# Patient Record
Sex: FEMALE | Race: WHITE | NOT HISPANIC OR LATINO
[De-identification: names, ages, dates, MRNs, and addresses within clinical notes are randomized per-mention and may not be internally consistent; named-entity substitution may affect disease eponyms.]

---

## 2017-02-13 ENCOUNTER — OTHER (OUTPATIENT)
Age: 72
End: 2017-02-13

## 2018-04-12 ENCOUNTER — NON-APPOINTMENT (OUTPATIENT)
Age: 73
End: 2018-04-12

## 2018-04-12 ENCOUNTER — APPOINTMENT (OUTPATIENT)
Dept: CARDIOLOGY | Facility: CLINIC | Age: 73
End: 2018-04-12
Payer: MEDICARE

## 2018-04-12 VITALS
SYSTOLIC BLOOD PRESSURE: 125 MMHG | BODY MASS INDEX: 24.27 KG/M2 | HEIGHT: 63 IN | DIASTOLIC BLOOD PRESSURE: 69 MMHG | OXYGEN SATURATION: 96 % | WEIGHT: 137 LBS | HEART RATE: 50 BPM

## 2018-04-12 DIAGNOSIS — R60.9 EDEMA, UNSPECIFIED: ICD-10-CM

## 2018-04-12 PROCEDURE — 99214 OFFICE O/P EST MOD 30 MIN: CPT

## 2018-04-12 PROCEDURE — 93000 ELECTROCARDIOGRAM COMPLETE: CPT

## 2018-04-13 ENCOUNTER — APPOINTMENT (OUTPATIENT)
Dept: CARDIOLOGY | Facility: CLINIC | Age: 73
End: 2018-04-13
Payer: MEDICARE

## 2018-04-13 PROCEDURE — 93306 TTE W/DOPPLER COMPLETE: CPT

## 2018-04-18 ENCOUNTER — APPOINTMENT (OUTPATIENT)
Dept: CARDIOLOGY | Facility: CLINIC | Age: 73
End: 2018-04-18
Payer: MEDICARE

## 2018-04-18 PROCEDURE — 93015 CV STRESS TEST SUPVJ I&R: CPT

## 2019-01-11 ENCOUNTER — NEW REFERRAL (OUTPATIENT)
Dept: URBAN - METROPOLITAN AREA CLINIC 33 | Facility: CLINIC | Age: 74
End: 2019-01-11

## 2019-01-11 VITALS
HEIGHT: 63 IN | BODY MASS INDEX: 24.45 KG/M2 | SYSTOLIC BLOOD PRESSURE: 158 MMHG | WEIGHT: 138 LBS | DIASTOLIC BLOOD PRESSURE: 79 MMHG | HEART RATE: 52 BPM

## 2019-01-11 DIAGNOSIS — H02.836: ICD-10-CM

## 2019-01-11 DIAGNOSIS — H43.392: ICD-10-CM

## 2019-01-11 DIAGNOSIS — H25.13: ICD-10-CM

## 2019-01-11 DIAGNOSIS — H35.3112: ICD-10-CM

## 2019-01-11 DIAGNOSIS — H02.833: ICD-10-CM

## 2019-01-11 DIAGNOSIS — H35.3221: ICD-10-CM

## 2019-01-11 DIAGNOSIS — H04.123: ICD-10-CM

## 2019-01-11 PROCEDURE — 92134 CPTRZ OPH DX IMG PST SGM RTA: CPT

## 2019-01-11 PROCEDURE — 92235 FLUORESCEIN ANGRPH MLTIFRAME: CPT

## 2019-01-11 PROCEDURE — 92250 FUNDUS PHOTOGRAPHY W/I&R: CPT

## 2019-01-11 PROCEDURE — 67028 INJECTION EYE DRUG: CPT

## 2019-01-11 PROCEDURE — 99204 OFFICE O/P NEW MOD 45 MIN: CPT

## 2019-01-11 ASSESSMENT — TONOMETRY
OS_IOP_MMHG: 14
OD_IOP_MMHG: 13

## 2019-01-11 ASSESSMENT — VISUAL ACUITY
OS_CC: 20/25
OD_CC: 20/30-1

## 2019-02-15 ENCOUNTER — CLINICAL PROCEDURE AND DIAGNOSTIC TESTING ONLY (OUTPATIENT)
Dept: URBAN - METROPOLITAN AREA CLINIC 33 | Facility: CLINIC | Age: 74
End: 2019-02-15

## 2019-02-15 DIAGNOSIS — H35.3221: ICD-10-CM

## 2019-02-15 DIAGNOSIS — H35.3112: ICD-10-CM

## 2019-02-15 PROCEDURE — 67028 INJECTION EYE DRUG: CPT

## 2019-02-15 PROCEDURE — 92134 CPTRZ OPH DX IMG PST SGM RTA: CPT

## 2019-02-15 ASSESSMENT — VISUAL ACUITY
OS_PH: 20/30+1
OS_CC: 20/50+1

## 2019-02-15 ASSESSMENT — TONOMETRY: OS_IOP_MMHG: 10

## 2019-03-22 ENCOUNTER — CLINICAL PROCEDURE AND DIAGNOSTIC TESTING ONLY (OUTPATIENT)
Dept: URBAN - METROPOLITAN AREA CLINIC 33 | Facility: CLINIC | Age: 74
End: 2019-03-22

## 2019-03-22 DIAGNOSIS — H35.3221: ICD-10-CM

## 2019-03-22 DIAGNOSIS — H35.3112: ICD-10-CM

## 2019-03-22 PROCEDURE — 67028 INJECTION EYE DRUG: CPT

## 2019-03-22 PROCEDURE — 92134 CPTRZ OPH DX IMG PST SGM RTA: CPT

## 2019-03-22 ASSESSMENT — TONOMETRY: OS_IOP_MMHG: 12

## 2019-03-22 ASSESSMENT — VISUAL ACUITY: OS_CC: 20/40-2

## 2019-05-20 ENCOUNTER — APPOINTMENT (OUTPATIENT)
Dept: CARDIOLOGY | Facility: CLINIC | Age: 74
End: 2019-05-20
Payer: MEDICARE

## 2019-05-20 ENCOUNTER — NON-APPOINTMENT (OUTPATIENT)
Age: 74
End: 2019-05-20

## 2019-05-20 VITALS
BODY MASS INDEX: 24.63 KG/M2 | HEART RATE: 58 BPM | WEIGHT: 139 LBS | DIASTOLIC BLOOD PRESSURE: 72 MMHG | HEIGHT: 63 IN | SYSTOLIC BLOOD PRESSURE: 116 MMHG | OXYGEN SATURATION: 97 %

## 2019-05-20 PROCEDURE — 93000 ELECTROCARDIOGRAM COMPLETE: CPT

## 2019-05-20 PROCEDURE — 99214 OFFICE O/P EST MOD 30 MIN: CPT

## 2020-01-17 ENCOUNTER — FOLLOW UP AND POST INJECTION EVALUATION (OUTPATIENT)
Dept: URBAN - METROPOLITAN AREA CLINIC 33 | Facility: CLINIC | Age: 75
End: 2020-01-17

## 2020-01-17 VITALS — HEIGHT: 63 IN | WEIGHT: 138 LBS | BODY MASS INDEX: 24.45 KG/M2

## 2020-01-17 DIAGNOSIS — H02.836: ICD-10-CM

## 2020-01-17 DIAGNOSIS — H43.392: ICD-10-CM

## 2020-01-17 DIAGNOSIS — H35.3112: ICD-10-CM

## 2020-01-17 DIAGNOSIS — H02.833: ICD-10-CM

## 2020-01-17 DIAGNOSIS — H04.123: ICD-10-CM

## 2020-01-17 DIAGNOSIS — H25.13: ICD-10-CM

## 2020-01-17 DIAGNOSIS — H35.3221: ICD-10-CM

## 2020-01-17 PROCEDURE — 92014 COMPRE OPH EXAM EST PT 1/>: CPT

## 2020-01-17 PROCEDURE — 67028 INJECTION EYE DRUG: CPT

## 2020-01-17 PROCEDURE — 92134 CPTRZ OPH DX IMG PST SGM RTA: CPT

## 2020-01-17 PROCEDURE — 92250 FUNDUS PHOTOGRAPHY W/I&R: CPT

## 2020-01-17 ASSESSMENT — VISUAL ACUITY
OD_CC: 20/40+2
OS_CC: 20/30-2

## 2020-01-17 ASSESSMENT — TONOMETRY
OS_IOP_MMHG: 13
OD_IOP_MMHG: 12

## 2020-02-17 ENCOUNTER — FOLLOW UP AND POST INJECTION EVALUATION (OUTPATIENT)
Dept: URBAN - METROPOLITAN AREA CLINIC 33 | Facility: CLINIC | Age: 75
End: 2020-02-17

## 2020-02-17 DIAGNOSIS — H35.3221: ICD-10-CM

## 2020-02-17 DIAGNOSIS — H43.392: ICD-10-CM

## 2020-02-17 DIAGNOSIS — H35.3112: ICD-10-CM

## 2020-02-17 PROCEDURE — 92134 CPTRZ OPH DX IMG PST SGM RTA: CPT

## 2020-02-17 PROCEDURE — 92012 INTRM OPH EXAM EST PATIENT: CPT

## 2020-02-17 PROCEDURE — 67028 INJECTION EYE DRUG: CPT

## 2020-02-17 PROCEDURE — 92250 FUNDUS PHOTOGRAPHY W/I&R: CPT

## 2020-02-17 ASSESSMENT — VISUAL ACUITY
OD_CC: 20/40-1
OS_CC: 20/30-2

## 2020-02-17 ASSESSMENT — TONOMETRY
OS_IOP_MMHG: 08
OD_IOP_MMHG: 08

## 2021-03-08 NOTE — PROCEDURE NOTE: CLINICAL
PROCEDURE NOTE: Avastin () OD. Diagnosis: Neovascular AMD with Active CNV. Anesthesia: Lidocaine 4%. Prep: Betadine Drops. Prior to injection, risks/benefits/alternatives discussed including infection, loss of vision, hemorrhage, cataract, glaucoma, retinal tears or detachment. The off-label status of Intravitreal Avastin also was reviewed. The patient wished to proceed with treatment. Topical anesthesia was induced with Alcaine. Additional anesthesia was achieved using drop(s) or injection checked above. a drop of Povidone-iodine 5% ophthalmic solution was instilled over the injection site and in the inferior fornix. Betadine prep was performed. Using the syringe provided, Avastin 1.25 mg in 0.05 cc was injected into the vitreous cavity. The needle was passed 3.0 mm posterior to the limbus in pseudophakic patients, and 3.5 mm posterior to the lumbus in phakic patients. The remainder of the Avastin in the single-use vial was then discarded in a medical waste disposal container. Unused medication was discarded. Patient tolerated procedure well. There were no complications. Injection time: 12:26PM. Post-op instructions given. Expiration Date: 05-. The patient was instructed to return for re-evaluation in approximately 4-12 weeks depending on his/her condition and was told to call immediately if vision decreases and/or if his/her eye becomes red, painful, and/or light sensitive. The patient was instructed to go to the emergency room or call 911 if unable to reach the doctor within an hour or two of trying or calling. The patient was instructed to use Artificial Tears q.i.d. p.r.n for comfort. Sheng Nazario

## 2021-04-13 NOTE — PATIENT DISCUSSION
Reviewed OPTIONS including AVASTIN/EYLEA/LUCENTIS/BEOVU and patient wants to proceed with AVASTIN treatment AND REPEAT EVERY 29 DAYS X 2 DOI.

## 2021-04-13 NOTE — PROCEDURE NOTE: CLINICAL
PROCEDURE NOTE: Avastin () OD. Diagnosis: Neovascular AMD with Active CNV. Anesthesia: Lidocaine 4%. Prep: Betadine Drops. Prior to injection, risks/benefits/alternatives discussed including infection, loss of vision, hemorrhage, cataract, glaucoma, retinal tears or detachment. The off-label status of Intravitreal Avastin also was reviewed. The patient wished to proceed with treatment. Topical anesthesia was induced with Alcaine. Additional anesthesia was achieved using drop(s) or injection checked above. a drop of Povidone-iodine 5% ophthalmic solution was instilled over the injection site and in the inferior fornix. Betadine prep was performed. Using the syringe provided, Avastin 1.25 mg in 0.05 cc was injected into the vitreous cavity. The needle was passed 3.0 mm posterior to the limbus in pseudophakic patients, and 3.5 mm posterior to the lumbus in phakic patients. The remainder of the Avastin in the single-use vial was then discarded in a medical waste disposal container. Unused medication was discarded. Patient tolerated procedure well. There were no complications. Injection time: 4:30PM. Post-op instructions given. Expiration Date: 6/22/2021. The patient was instructed to return for re-evaluation in approximately 4-12 weeks depending on his/her condition and was told to call immediately if vision decreases and/or if his/her eye becomes red, painful, and/or light sensitive. The patient was instructed to go to the emergency room or call 911 if unable to reach the doctor within an hour or two of trying or calling. The patient was instructed to use Artificial Tears q.i.d. p.r.n for comfort. Yvonne Rodriguez

## 2021-05-12 NOTE — PATIENT DISCUSSION
5 12 21 AVASTIN TODAY - MILD SRF DESPITE A X 2 - NO FURTHER IMPROVEMENT - REVIEWED WITH PT WHO WOULD LIKE TO CHANGE TO EYLEA IF INSURANCE WILL APPROVE FOR HER NEXT VISIT.

## 2021-05-12 NOTE — PROCEDURE NOTE: CLINICAL
PROCEDURE NOTE: Avastin () OD. Diagnosis: Neovascular AMD with Active CNV. Anesthesia: Lidocaine 4%. Prep: Betadine Drops. Prior to injection, risks/benefits/alternatives discussed including infection, loss of vision, hemorrhage, cataract, glaucoma, retinal tears or detachment. The off-label status of Intravitreal Avastin also was reviewed. The patient wished to proceed with treatment. Topical anesthesia was induced with Alcaine. Additional anesthesia was achieved using drop(s) or injection checked above. a drop of Povidone-iodine 5% ophthalmic solution was instilled over the injection site and in the inferior fornix. Betadine prep was performed. Using the syringe provided, Avastin 1.25 mg in 0.05 cc was injected into the vitreous cavity. The needle was passed 3.0 mm posterior to the limbus in pseudophakic patients, and 3.5 mm posterior to the lumbus in phakic patients. The remainder of the Avastin in the single-use vial was then discarded in a medical waste disposal container. Unused medication was discarded. Patient tolerated procedure well. There were no complications. Injection time: 900AM. Post-op instructions given. Expiration Date: 6/24/2021. The patient was instructed to return for re-evaluation in approximately 4-12 weeks depending on his/her condition and was told to call immediately if vision decreases and/or if his/her eye becomes red, painful, and/or light sensitive. The patient was instructed to go to the emergency room or call 911 if unable to reach the doctor within an hour or two of trying or calling. The patient was instructed to use Artificial Tears q.i.d. p.r.n for comfort. Latricia Castellano

## 2021-06-10 NOTE — PROCEDURE NOTE: CLINICAL
PROCEDURE NOTE: Avastin () OD. Diagnosis: Neovascular AMD with Active CNV. Anesthesia: Lidocaine 4%. Prep: Betadine Drops. Prior to injection, risks/benefits/alternatives discussed including infection, loss of vision, hemorrhage, cataract, glaucoma, retinal tears or detachment. The off-label status of Intravitreal Avastin also was reviewed. The patient wished to proceed with treatment. Topical anesthesia was induced with Alcaine. Additional anesthesia was achieved using drop(s) or injection checked above. a drop of Povidone-iodine 5% ophthalmic solution was instilled over the injection site and in the inferior fornix. Betadine prep was performed. Using the syringe provided, Avastin 1.25 mg in 0.05 cc was injected into the vitreous cavity. The needle was passed 3.0 mm posterior to the limbus in pseudophakic patients, and 3.5 mm posterior to the lumbus in phakic patients. The remainder of the Avastin in the single-use vial was then discarded in a medical waste disposal container. Unused medication was discarded. Patient tolerated procedure well. There were no complications. Injection time: 4:00 PM. Post-op instructions given. Expiration Date: 8/2/2021. The patient was instructed to return for re-evaluation in approximately 4-12 weeks depending on his/her condition and was told to call immediately if vision decreases and/or if his/her eye becomes red, painful, and/or light sensitive. The patient was instructed to go to the emergency room or call 911 if unable to reach the doctor within an hour or two of trying or calling. The patient was instructed to use Artificial Tears q.i.d. p.r.n for comfort. Samantha Uribe

## 2021-07-12 NOTE — PROCEDURE NOTE: CLINICAL
PROCEDURE NOTE: Eylea PFS OD. Diagnosis: Neovascular AMD with Active CNV. Anesthesia: Topical. Prep: Betadine Drops. Prior to injection, risks/benefits/alternatives discussed including but not limited to infection, loss of vision or eye, hemorrhage, cataract, glaucoma, retinal tears or detachment. The patient wished to proceed with treatment. Betadine prep was performed. Topical anesthesia was induced with Alcaine. Additional anesthesia was achieved using drop(s) or injection checked above. A drop of Povidone-iodine 5% ophthalmic solution was instilled over the injection site and in the inferior fornix. A single use prefilled syringe of intravitreal Eylea 2mg/0.05ml was used and excess was disposed of as waste. The needle was passed 3.0 mm posterior to the limbus in pseudophakic patients, and 3.5 mm posterior to the limbus in phakic patients. Injection time: 10:58 AM.  Patient tolerated procedure well. There were no complications. The eye was irrigated with sterile irrigating solution. Post procedure instructions given. The patient was instructed to use Artificial Tears q.i.d. p.r.n for comfort. The patient was instructed to return for re-evaluation in approximately 4-12 weeks depending on his/her condition and was told to call immediately if vision decreases and/or if his/her eye becomes red, painful, and/or light sensitive. The patient was instructed to go to the emergency room or call 911 if unable to reach the doctor within an hour or two of trying or calling. Donnie Morrow

## 2021-07-12 NOTE — PATIENT DISCUSSION
7 12 21 MILD SRF 29 DAYS - IMPROVING BUT HAS NOT RESOLVED - TO TRY SWITCHING TO EYLEA TO SEE IF IT WORKS BETTER AND RE-EVAL WITH CHANDNI TRAMMELL IN 34 DAYS AT [facility] IN [facility].

## 2021-08-12 ENCOUNTER — APPOINTMENT (OUTPATIENT)
Dept: OPHTHALMOLOGY | Facility: CLINIC | Age: 76
End: 2021-08-12

## 2021-11-09 NOTE — PATIENT DISCUSSION
7 12 21 MILD SRF 29 DAYS - IMPROVING BUT HAS NOT RESOLVED - TO TRY SWITCHING TO EYLEA TO SEE IF IT WORKS BETTER AND RE-EVAL WITH CHANDNI TRAMMELL IN 34 DAYS AT [facility] IN [facility].

## 2021-11-09 NOTE — PROCEDURE NOTE: CLINICAL

## 2021-12-10 ENCOUNTER — NON-APPOINTMENT (OUTPATIENT)
Age: 76
End: 2021-12-10

## 2021-12-10 ENCOUNTER — APPOINTMENT (OUTPATIENT)
Dept: CARDIOLOGY | Facility: CLINIC | Age: 76
End: 2021-12-10
Payer: MEDICARE

## 2021-12-10 VITALS
SYSTOLIC BLOOD PRESSURE: 133 MMHG | BODY MASS INDEX: 24.45 KG/M2 | OXYGEN SATURATION: 97 % | HEART RATE: 55 BPM | DIASTOLIC BLOOD PRESSURE: 64 MMHG | HEIGHT: 63 IN | WEIGHT: 138 LBS

## 2021-12-10 DIAGNOSIS — I10 ESSENTIAL (PRIMARY) HYPERTENSION: ICD-10-CM

## 2021-12-10 DIAGNOSIS — R06.02 SHORTNESS OF BREATH: ICD-10-CM

## 2021-12-10 PROCEDURE — 93000 ELECTROCARDIOGRAM COMPLETE: CPT

## 2021-12-10 PROCEDURE — 99214 OFFICE O/P EST MOD 30 MIN: CPT

## 2021-12-15 NOTE — PATIENT DISCUSSION
7 12 21 MILD SRF 29 DAYS - IMPROVING BUT HAS NOT RESOLVED - TO TRY SWITCHING TO EYLEA TO SEE IF IT WORKS BETTER AND RE-EVAL WITH CHANDNI TRAMMELL IN 34 DAYS AT [facility] IN [facility].

## 2021-12-15 NOTE — PROCEDURE NOTE: CLINICAL
PROCEDURE NOTE: Eylea PFS OD. Diagnosis: Neovascular AMD with Active CNV. Anesthesia: Topical. Prep: Betadine Drops. Prior to injection, risks/benefits/alternatives discussed including but not limited to infection, loss of vision or eye, hemorrhage, cataract, glaucoma, retinal tears or detachment. The patient wished to proceed with treatment. Betadine prep was performed. Topical anesthesia was induced with Alcaine. Additional anesthesia was achieved using drop(s) or injection checked above. A drop of Povidone-iodine 5% ophthalmic solution was instilled over the injection site and in the inferior fornix. A single use prefilled syringe of intravitreal Eylea 2mg/0.05ml was used and excess was disposed of as waste. The needle was passed 3.0 mm posterior to the limbus in pseudophakic patients, and 3.5 mm posterior to the limbus in phakic patients. Injection time: *. Patient tolerated procedure well. There were no complications. The eye was irrigated with sterile irrigating solution. Post procedure instructions given. The patient was instructed to use Artificial Tears q.i.d. p.r.n for comfort. The patient was instructed to return for re-evaluation in approximately 4-12 weeks depending on his/her condition and was told to call immediately if vision decreases and/or if his/her eye becomes red, painful, and/or light sensitive. The patient was instructed to go to the emergency room or call 911 if unable to reach the doctor within an hour or two of trying or calling. Umesh Iglesias

## 2022-01-27 ENCOUNTER — FOLLOW UP (OUTPATIENT)
Dept: URBAN - METROPOLITAN AREA CLINIC 33 | Facility: CLINIC | Age: 77
End: 2022-01-27

## 2022-01-27 VITALS — HEIGHT: 63 IN | WEIGHT: 135 LBS | BODY MASS INDEX: 23.92 KG/M2

## 2022-01-27 DIAGNOSIS — H25.13: ICD-10-CM

## 2022-01-27 DIAGNOSIS — H35.3221: ICD-10-CM

## 2022-01-27 DIAGNOSIS — H43.392: ICD-10-CM

## 2022-01-27 DIAGNOSIS — H35.3112: ICD-10-CM

## 2022-01-27 DIAGNOSIS — H04.123: ICD-10-CM

## 2022-01-27 PROCEDURE — 92134 CPTRZ OPH DX IMG PST SGM RTA: CPT

## 2022-01-27 PROCEDURE — 67028 INJECTION EYE DRUG: CPT

## 2022-01-27 PROCEDURE — 92014 COMPRE OPH EXAM EST PT 1/>: CPT

## 2022-01-27 ASSESSMENT — VISUAL ACUITY
OS_PH: 20/60
OD_PH: 20/30-2
OS_CC: 20/70-1
OD_CC: 20/50+2

## 2022-01-27 ASSESSMENT — TONOMETRY
OS_IOP_MMHG: 10
OD_IOP_MMHG: 13

## 2022-03-03 ENCOUNTER — CLINIC PROCEDURE ONLY (OUTPATIENT)
Dept: URBAN - METROPOLITAN AREA CLINIC 33 | Facility: CLINIC | Age: 77
End: 2022-03-03

## 2022-03-03 DIAGNOSIS — H35.3112: ICD-10-CM

## 2022-03-03 DIAGNOSIS — H35.3221: ICD-10-CM

## 2022-03-03 PROCEDURE — 92250 FUNDUS PHOTOGRAPHY W/I&R: CPT

## 2022-03-03 PROCEDURE — 92134 CPTRZ OPH DX IMG PST SGM RTA: CPT

## 2022-03-03 PROCEDURE — 67028 INJECTION EYE DRUG: CPT

## 2022-03-03 ASSESSMENT — TONOMETRY: OS_IOP_MMHG: 12

## 2022-06-29 ENCOUNTER — APPOINTMENT (OUTPATIENT)
Dept: ORTHOPEDIC SURGERY | Facility: CLINIC | Age: 77
End: 2022-06-29

## 2022-06-29 VITALS — WEIGHT: 140 LBS | HEIGHT: 63 IN | BODY MASS INDEX: 24.8 KG/M2

## 2022-06-29 DIAGNOSIS — M70.61 TROCHANTERIC BURSITIS, RIGHT HIP: ICD-10-CM

## 2022-06-29 DIAGNOSIS — M25.561 PAIN IN RIGHT KNEE: ICD-10-CM

## 2022-06-29 DIAGNOSIS — M54.16 RADICULOPATHY, LUMBAR REGION: ICD-10-CM

## 2022-06-29 DIAGNOSIS — M47.816 SPONDYLOSIS W/OUT MYELOPATHY OR RADICULOPATHY, LUMBAR REGION: ICD-10-CM

## 2022-06-29 DIAGNOSIS — G89.29 PAIN IN RIGHT KNEE: ICD-10-CM

## 2022-06-29 PROCEDURE — 72110 X-RAY EXAM L-2 SPINE 4/>VWS: CPT

## 2022-06-29 PROCEDURE — 99214 OFFICE O/P EST MOD 30 MIN: CPT

## 2022-06-29 PROCEDURE — 72170 X-RAY EXAM OF PELVIS: CPT

## 2022-06-29 PROCEDURE — 99204 OFFICE O/P NEW MOD 45 MIN: CPT

## 2022-06-29 PROCEDURE — 73562 X-RAY EXAM OF KNEE 3: CPT | Mod: 50

## 2022-06-29 RX ORDER — METHYLPREDNISOLONE 4 MG/1
4 TABLET ORAL
Qty: 1 | Refills: 1 | Status: ACTIVE | COMMUNITY
Start: 2022-06-29 | End: 1900-01-01

## 2022-06-29 NOTE — PHYSICAL EXAM
[Flexion] : flexion [Extension] : extension [Right] : right knee [] : non-antalgic [FreeTextEntry3] : tenderness around the right knee

## 2022-06-29 NOTE — DISCUSSION/SUMMARY
[de-identified] : appears to have right knee OA, RIGHT HIP BURSITIS, LUMBAR DEGNERATIVE changes \par PT \par discussed MRi of the Lower back \par \par \par \par  Physician/Provider/Chart(s)

## 2022-06-29 NOTE — HISTORY OF PRESENT ILLNESS
[Lower back] : lower back [Gradual] : gradual [8] : 8 [2] : 2 [Localized] : localized [Radiating] : radiating [Intermittent] : intermittent [Rest] : rest [Sitting] : sitting [Walking] : walking [de-identified] : 6/29/22: 77 Female here for evaluation for BL low back pain. Denies injury/ surgery. Pain radiates across the back, into the buttock and down the Right leg, not past the ankle. Aside from some occasional tingling in Left leg, denies weakness/ N/T. Pain aggravated by sitting/ standing long periods. Uses heat/ OTC NSAIDs with some relief. \par \par the PT she did in the past for pain was helpful \par \par aLSO WITh pan in the right knee - knee pain wakes her up at night \par \par Denies formal treatment. \par \par Has not tried PT/ Chiro/ Acupuncture/ not had MRI\par \par PMH: Denies DM/ Cancers/ no blood thinners aside from baby asprin\par \par xrays today:\par L spine - diffuse spondylossis\par AP PELVIS - NO SEVERE oa \par R knee -  [] : no [FreeTextEntry5] : no injury  [FreeTextEntry7] : right leg  [de-identified] : nothing

## 2023-04-25 ENCOUNTER — APPOINTMENT (OUTPATIENT)
Dept: OPHTHALMOLOGY | Facility: CLINIC | Age: 78
End: 2023-04-25
Payer: MEDICARE

## 2023-04-25 ENCOUNTER — NON-APPOINTMENT (OUTPATIENT)
Age: 78
End: 2023-04-25

## 2023-04-25 PROCEDURE — 92134 CPTRZ OPH DX IMG PST SGM RTA: CPT

## 2023-04-25 PROCEDURE — 92004 COMPRE OPH EXAM NEW PT 1/>: CPT

## 2023-04-25 PROCEDURE — 92136 OPHTHALMIC BIOMETRY: CPT

## 2023-05-22 ENCOUNTER — APPOINTMENT (OUTPATIENT)
Dept: OPHTHALMOLOGY | Facility: AMBULATORY MEDICAL SERVICES | Age: 78
End: 2023-05-22
Payer: MEDICARE

## 2023-05-22 PROCEDURE — 66984 XCAPSL CTRC RMVL W/O ECP: CPT | Mod: LT

## 2023-05-23 ENCOUNTER — NON-APPOINTMENT (OUTPATIENT)
Age: 78
End: 2023-05-23

## 2023-05-23 ENCOUNTER — APPOINTMENT (OUTPATIENT)
Dept: OPHTHALMOLOGY | Facility: CLINIC | Age: 78
End: 2023-05-23
Payer: MEDICARE

## 2023-05-23 PROCEDURE — 99024 POSTOP FOLLOW-UP VISIT: CPT

## 2023-05-30 ENCOUNTER — APPOINTMENT (OUTPATIENT)
Dept: OPHTHALMOLOGY | Facility: CLINIC | Age: 78
End: 2023-05-30
Payer: MEDICARE

## 2023-05-30 ENCOUNTER — NON-APPOINTMENT (OUTPATIENT)
Age: 78
End: 2023-05-30

## 2023-05-30 PROCEDURE — 99024 POSTOP FOLLOW-UP VISIT: CPT

## 2023-06-27 ENCOUNTER — APPOINTMENT (OUTPATIENT)
Dept: OPHTHALMOLOGY | Facility: CLINIC | Age: 78
End: 2023-06-27
Payer: MEDICARE

## 2023-06-27 ENCOUNTER — NON-APPOINTMENT (OUTPATIENT)
Age: 78
End: 2023-06-27

## 2023-06-27 PROCEDURE — 99024 POSTOP FOLLOW-UP VISIT: CPT

## 2023-08-11 ENCOUNTER — INPATIENT (INPATIENT)
Facility: HOSPITAL | Age: 78
LOS: 6 days | Discharge: HOME | DRG: 812 | End: 2023-08-18
Attending: INTERNAL MEDICINE | Admitting: INTERNAL MEDICINE
Payer: MEDICARE

## 2023-08-11 VITALS
HEART RATE: 71 BPM | HEIGHT: 63 IN | DIASTOLIC BLOOD PRESSURE: 67 MMHG | SYSTOLIC BLOOD PRESSURE: 115 MMHG | TEMPERATURE: 99 F | OXYGEN SATURATION: 98 % | WEIGHT: 134.92 LBS | RESPIRATION RATE: 20 BRPM

## 2023-08-11 DIAGNOSIS — D64.9 ANEMIA, UNSPECIFIED: ICD-10-CM

## 2023-08-11 LAB
ABO RH CONFIRMATION: SIGNIFICANT CHANGE UP
ALBUMIN SERPL ELPH-MCNC: 3.4 G/DL — SIGNIFICANT CHANGE UP (ref 3.3–5)
ALP SERPL-CCNC: 86 U/L — SIGNIFICANT CHANGE UP (ref 40–120)
ALT FLD-CCNC: 18 U/L — SIGNIFICANT CHANGE UP (ref 12–78)
ANION GAP SERPL CALC-SCNC: 7 MMOL/L — SIGNIFICANT CHANGE UP (ref 5–17)
APTT BLD: 27.6 SEC — SIGNIFICANT CHANGE UP (ref 24.5–35.6)
AST SERPL-CCNC: 15 U/L — SIGNIFICANT CHANGE UP (ref 15–37)
BASOPHILS # BLD AUTO: 0.04 K/UL — SIGNIFICANT CHANGE UP (ref 0–0.2)
BASOPHILS NFR BLD AUTO: 0.5 % — SIGNIFICANT CHANGE UP (ref 0–2)
BILIRUB SERPL-MCNC: 0.5 MG/DL — SIGNIFICANT CHANGE UP (ref 0.2–1.2)
BUN SERPL-MCNC: 14 MG/DL — SIGNIFICANT CHANGE UP (ref 7–23)
CALCIUM SERPL-MCNC: 9 MG/DL — SIGNIFICANT CHANGE UP (ref 8.5–10.1)
CHLORIDE SERPL-SCNC: 106 MMOL/L — SIGNIFICANT CHANGE UP (ref 96–108)
CO2 SERPL-SCNC: 27 MMOL/L — SIGNIFICANT CHANGE UP (ref 22–31)
CREAT SERPL-MCNC: 0.99 MG/DL — SIGNIFICANT CHANGE UP (ref 0.5–1.3)
EGFR: 58 ML/MIN/1.73M2 — LOW
EOSINOPHIL # BLD AUTO: 0.03 K/UL — SIGNIFICANT CHANGE UP (ref 0–0.5)
EOSINOPHIL NFR BLD AUTO: 0.4 % — SIGNIFICANT CHANGE UP (ref 0–6)
GLUCOSE SERPL-MCNC: 114 MG/DL — HIGH (ref 70–99)
HCT VFR BLD CALC: 22.2 % — LOW (ref 34.5–45)
HGB BLD-MCNC: 7.2 G/DL — LOW (ref 11.5–15.5)
IMM GRANULOCYTES NFR BLD AUTO: 0.5 % — SIGNIFICANT CHANGE UP (ref 0–0.9)
INR BLD: 0.93 RATIO — SIGNIFICANT CHANGE UP (ref 0.85–1.18)
LYMPHOCYTES # BLD AUTO: 2.22 K/UL — SIGNIFICANT CHANGE UP (ref 1–3.3)
LYMPHOCYTES # BLD AUTO: 29.8 % — SIGNIFICANT CHANGE UP (ref 13–44)
MCHC RBC-ENTMCNC: 28 PG — SIGNIFICANT CHANGE UP (ref 27–34)
MCHC RBC-ENTMCNC: 32.4 GM/DL — SIGNIFICANT CHANGE UP (ref 32–36)
MCV RBC AUTO: 86.4 FL — SIGNIFICANT CHANGE UP (ref 80–100)
MONOCYTES # BLD AUTO: 0.58 K/UL — SIGNIFICANT CHANGE UP (ref 0–0.9)
MONOCYTES NFR BLD AUTO: 7.8 % — SIGNIFICANT CHANGE UP (ref 2–14)
NEUTROPHILS # BLD AUTO: 4.55 K/UL — SIGNIFICANT CHANGE UP (ref 1.8–7.4)
NEUTROPHILS NFR BLD AUTO: 61 % — SIGNIFICANT CHANGE UP (ref 43–77)
NRBC # BLD: 0 /100 WBCS — SIGNIFICANT CHANGE UP (ref 0–0)
OB PNL STL: POSITIVE
PLATELET # BLD AUTO: 250 K/UL — SIGNIFICANT CHANGE UP (ref 150–400)
POTASSIUM SERPL-MCNC: 3 MMOL/L — LOW (ref 3.5–5.3)
POTASSIUM SERPL-SCNC: 3 MMOL/L — LOW (ref 3.5–5.3)
PROT SERPL-MCNC: 7 G/DL — SIGNIFICANT CHANGE UP (ref 6–8.3)
PROTHROM AB SERPL-ACNC: 10.9 SEC — SIGNIFICANT CHANGE UP (ref 9.5–13)
RBC # BLD: 2.57 M/UL — LOW (ref 3.8–5.2)
RBC # FLD: 14.6 % — HIGH (ref 10.3–14.5)
SODIUM SERPL-SCNC: 140 MMOL/L — SIGNIFICANT CHANGE UP (ref 135–145)
TROPONIN I, HIGH SENSITIVITY RESULT: 8.6 NG/L — SIGNIFICANT CHANGE UP
WBC # BLD: 7.46 K/UL — SIGNIFICANT CHANGE UP (ref 3.8–10.5)
WBC # FLD AUTO: 7.46 K/UL — SIGNIFICANT CHANGE UP (ref 3.8–10.5)

## 2023-08-11 PROCEDURE — 93010 ELECTROCARDIOGRAM REPORT: CPT

## 2023-08-11 PROCEDURE — 99285 EMERGENCY DEPT VISIT HI MDM: CPT | Mod: FS

## 2023-08-11 RX ORDER — POTASSIUM CHLORIDE 20 MEQ
40 PACKET (EA) ORAL EVERY 4 HOURS
Refills: 0 | Status: COMPLETED | OUTPATIENT
Start: 2023-08-11 | End: 2023-08-12

## 2023-08-11 RX ORDER — PANTOPRAZOLE SODIUM 20 MG/1
40 TABLET, DELAYED RELEASE ORAL DAILY
Refills: 0 | Status: DISCONTINUED | OUTPATIENT
Start: 2023-08-11 | End: 2023-08-14

## 2023-08-11 RX ADMIN — Medication 40 MILLIEQUIVALENT(S): at 23:07

## 2023-08-11 NOTE — H&P ADULT - HISTORY OF PRESENT ILLNESS
78-year-old female pmh HTN, HLD,  sent in today by Dr. Kezia Rosa for blood transfusion.    Patient reports that she had labs done a few weeks ago which was found to be anemic.  Patient followed up with a gastroenterologist Dr. Sanchez.   Patient had a colonoscopy in July 31 in which she reports she had a polyp/AVM .  Patient notes that she had some mild blood in her diarrhea for about 4 days after her colonoscopy. pt admits to intermittent SOB and palpitations. Patient had blood test drawn yesterday in which she was advised to go to the ER immediately fro bld transfusion

## 2023-08-11 NOTE — ED PROVIDER NOTE - OBJECTIVE STATEMENT
78-year-old female sent in today by Dr. Kezia Rosa for blood transfusion.  Patient reports that she had labs done a few weeks ago which was found to be anemic.  Patient followed up with a gastroenterologist Dr. Bashir.  Patient had blood test drawn yesterday in which she was advised to go to the ER immediately.  Patient had a colonoscopy in July 31 in which she reports she had a polyp in the pain clinic.  Patient notes that she had some mild blood in her diarrhea for about 4 days after her colonoscopy. pt admits to intermittent SOB and palpitations. pt denies cp, fever, vomiting, abdominal pain, hematuria, other forms of bleeding, or any other complaints. 78-year-old female sent in today by Dr. Kezia Rosa for blood transfusion.  Patient reports that she had labs done a few weeks ago which was found to be anemic.  Patient followed up with a gastroenterologist Dr. Sanchez.  Patient had blood test drawn yesterday in which she was advised to go to the ER immediately.  Patient had a colonoscopy in July 31 in which she reports she had a polyp in the pain clinic.  Patient notes that she had some mild blood in her diarrhea for about 4 days after her colonoscopy. pt admits to intermittent SOB and palpitations. pt denies cp, fever, vomiting, abdominal pain, hematuria, other forms of bleeding, or any other complaints.

## 2023-08-11 NOTE — ED PROVIDER NOTE - CLINICAL SUMMARY MEDICAL DECISION MAKING FREE TEXT BOX
78-year-old female sent in today by Dr Leung for blood transfusion.  Patient reports that she had labs done a few weeks ago which was found to be anemic.  Patient followed up with a gastroenterologist Dr. Bashir  Patient had blood test drawn yesterday in which she was advised to go to the ER immediately.  Patient had a colonoscopy in July 31 had had ?AVM and something on her endoscopy that was bleeing. .  Patient notes that she had some mild blood in her diarrhea for about 4 days after her colonoscopy. pt admits to intermittent SOB and palpitations. pt denies cp, fever, vomiting, abdominal pain, hematuria, other forms of bleeding, or any other complaints.  pt well appearing, vitals wnl, abd nontender, will check h/h transfuse as needed, GI

## 2023-08-11 NOTE — ED ADULT NURSE NOTE - CADM POA CENTRAL LINE
CC:  Hali Bamberger   Chief Complaint   Patient presents with   â¢ Office Visit   â¢ Surgical Followup     Post op, DOS 01/04/2022 right shoulder labral repair     Referring MD: Danay Powers MD  PCP: Irena Saucedo MD  Medications: medications verified, no change  Refills needed today? NO  denies known Latex allergy or symptoms of Latex sensitivity. Patient would like communication of their results via:      Cell Phone:   Telephone Information:   Mobile 21 [phone] to leave a message containing results?  Yes  Tobacco history: verified No

## 2023-08-11 NOTE — ED PROVIDER NOTE - NS ED ATTENDING STATEMENT MOD
This was a shared visit with the VONNIE. I reviewed and verified the documentation and independently performed the documented:

## 2023-08-11 NOTE — ED PROVIDER NOTE - PROGRESS NOTE DETAILS
[facility] Rene accepting, advised order 1 unit. CLARI Sanchez service paged.

## 2023-08-11 NOTE — ED ADULT NURSE NOTE - CHIEF COMPLAINT QUOTE
I was told by Dr. Terrell to come here for a transfusion.  They noticed that my numbers (hgb / hct) were low during a physical in July.  I was told to go to Dr. Bashir (GI) and on 7/31, I had a colonoscopy.  I was told I had polyps and "something with a vein, which Mckay "clipped".  I was then told to go back for blood work up which I did and then told to come here for a transfusion.  I do feel weak and occasionally get sob.  I feel "off".  I do not notice any rectal or vaginal bleeding.  However, after my colonoscopy for a few days, I had a terrible bout of diarrhea.  it was red, but I associated that with the large number of beets I was eating at the time.  I have not had any bloody diarrhea / stools since.

## 2023-08-11 NOTE — ED ADULT NURSE NOTE - NSFALLUNIVINTERV_ED_ALL_ED
Bed/Stretcher in lowest position, wheels locked, appropriate side rails in place/Call bell, personal items and telephone in reach/Instruct patient to call for assistance before getting out of bed/chair/stretcher/Non-slip footwear applied when patient is off stretcher/[place] to call system/Physically safe environment - no spills, clutter or unnecessary equipment/Purposeful proactive rounding/Room/bathroom lighting operational, light cord in reach

## 2023-08-11 NOTE — ED ADULT TRIAGE NOTE - CHIEF COMPLAINT QUOTE
I was told by Dr. Worthington to come here for a transfusion.  They noticed that my numbers (hgb / hct) were low during a physical in July.  I was told to go to Dr. Bashir (GI) and on 7/31, I had a colonoscopy.  I was told I had polyps and "something with a vein, which Mckay "clipped".  I was then told to go back for blood work up which I did and then told to come here for a transfusion.  I do feel weak and occasionally get sob.  I feel "off". I was told by Dr. Terrell to come here for a transfusion.  They noticed that my numbers (hgb / hct) were low during a physical in July.  I was told to go to Dr. Bashir (GI) and on 7/31, I had a colonoscopy.  I was told I had polyps and "something with a vein, which Mckay "clipped".  I was then told to go back for blood work up which I did and then told to come here for a transfusion.  I do feel weak and occasionally get sob.  I feel "off".  I do not notice any rectal or vaginal bleeding.  However, after my colonoscopy for a few days, I had a terrible bout of diarrhea.  it was red, but I associated that with the large number of beets I was eating at the time.  I have not had any bloody diarrhea / stools since.

## 2023-08-11 NOTE — ED ADULT NURSE NOTE - CAS TRG GEN SKIN COLOR
I would prefer to go ahead and add losartan 100 mg po qd and not wait for bp to come down  Stress is a part of life and our bp needs to stay controlled during stress  continue hctz same dose  Add losartan 100 mg po qd # 30 with 3 refills  Occasionally pt who had cough with lisinopril can get cough with losartan too but not often  Monitor bp , monitor symptoms and inform us with bp numbers and if cough or other symptoms  Fu with me in clinic in 3 weeks   Normal for race

## 2023-08-11 NOTE — H&P ADULT - NSICDXPASTMEDICALHX_GEN_ALL_CORE_FT
PAST MEDICAL HISTORY:  GI AVM (gastrointestinal arteriovenous vascular malformation)     HTN (hypertension)     Hyperlipidemia

## 2023-08-11 NOTE — PATIENT PROFILE ADULT - FALL HARM RISK - HARM RISK INTERVENTIONS

## 2023-08-11 NOTE — H&P ADULT - ASSESSMENT
78-year-old female pmh HTN, HLD, recent colonoscopy-- GI AVM, aw few days hx of diarrhea, dizziness and sob, sent in by pcp for blood transfusion dt worsening anemia on oupt labs    #symptomatic anemia- acute blood loss anemia dt GIbleed  +FOBT  receiving 1u  prbc transfusion in ED  will monitor cbc closely, active t&S  transfuse for hb <8 or symptoms  cont PPI  GI cs- left message with Dr Bashir/dr Clemente's office    #HTN- hold home meds in the setting of blood loss anemia  monitor Bp and start meds as required    #DVT ppx- venodynes    [facility]   [phone]

## 2023-08-11 NOTE — ED PROVIDER NOTE - PHYSICAL EXAMINATION

## 2023-08-11 NOTE — ED ADULT NURSE NOTE - NS ED NURSE RECORD ANOTHER HT AND WT
González Gee was in the clinic today to see KEN Escobar for   Chief Complaint   Patient presents with   • Office Visit   • Consultation     pancreatic cyst on scan during hospitalization   .    Please note, his blood pressures were elevated x2 while in the clinic.    BP Readings from Last 1 Encounters:   09/28/21 1357 (!) 154/82   09/28/21 1350 (!) 160/88       Please review with PCP and follow up with patient as appropriate.   Yes

## 2023-08-12 LAB
ANION GAP SERPL CALC-SCNC: 6 MMOL/L — SIGNIFICANT CHANGE UP (ref 5–17)
BUN SERPL-MCNC: 11 MG/DL — SIGNIFICANT CHANGE UP (ref 7–23)
CALCIUM SERPL-MCNC: 8.3 MG/DL — LOW (ref 8.5–10.1)
CHLORIDE SERPL-SCNC: 109 MMOL/L — HIGH (ref 96–108)
CO2 SERPL-SCNC: 29 MMOL/L — SIGNIFICANT CHANGE UP (ref 22–31)
CREAT SERPL-MCNC: 0.97 MG/DL — SIGNIFICANT CHANGE UP (ref 0.5–1.3)
EGFR: 59 ML/MIN/1.73M2 — LOW
GLUCOSE SERPL-MCNC: 97 MG/DL — SIGNIFICANT CHANGE UP (ref 70–99)
HCT VFR BLD CALC: 26.6 % — LOW (ref 34.5–45)
HGB BLD-MCNC: 9 G/DL — LOW (ref 11.5–15.5)
MCHC RBC-ENTMCNC: 28.7 PG — SIGNIFICANT CHANGE UP (ref 27–34)
MCHC RBC-ENTMCNC: 33.8 GM/DL — SIGNIFICANT CHANGE UP (ref 32–36)
MCV RBC AUTO: 84.7 FL — SIGNIFICANT CHANGE UP (ref 80–100)
NRBC # BLD: 0 /100 WBCS — SIGNIFICANT CHANGE UP (ref 0–0)
PLATELET # BLD AUTO: 239 K/UL — SIGNIFICANT CHANGE UP (ref 150–400)
POTASSIUM SERPL-MCNC: 4 MMOL/L — SIGNIFICANT CHANGE UP (ref 3.5–5.3)
POTASSIUM SERPL-SCNC: 4 MMOL/L — SIGNIFICANT CHANGE UP (ref 3.5–5.3)
RBC # BLD: 3.14 M/UL — LOW (ref 3.8–5.2)
RBC # FLD: 14.6 % — HIGH (ref 10.3–14.5)
SODIUM SERPL-SCNC: 144 MMOL/L — SIGNIFICANT CHANGE UP (ref 135–145)
WBC # BLD: 10.87 K/UL — HIGH (ref 3.8–10.5)
WBC # FLD AUTO: 10.87 K/UL — HIGH (ref 3.8–10.5)

## 2023-08-12 RX ADMIN — Medication 40 MILLIEQUIVALENT(S): at 01:59

## 2023-08-12 RX ADMIN — Medication 40 MILLIEQUIVALENT(S): at 05:30

## 2023-08-12 RX ADMIN — PANTOPRAZOLE SODIUM 40 MILLIGRAM(S): 20 TABLET, DELAYED RELEASE ORAL at 12:12

## 2023-08-12 NOTE — CARE COORDINATION ASSESSMENT. - NSPASTMEDSURGHISTORY_GEN_ALL_CORE_FT
PAST MEDICAL & SURGICAL HISTORY:  GI AVM (gastrointestinal arteriovenous vascular malformation)      Hyperlipidemia      HTN (hypertension)

## 2023-08-12 NOTE — PROGRESS NOTE ADULT - SUBJECTIVE AND OBJECTIVE BOX
Patient is a 78y old  Female who presents with a chief complaint of     INTERVAL HPI/OVERNIGHT EVENTS: noted  pt seen and examined this am   events noted      Vital Signs Last 24 Hrs  T(C): 36.6 (12 Aug 2023 12:57), Max: 37.1 (11 Aug 2023 19:22)  T(F): 97.8 (12 Aug 2023 12:57), Max: 98.7 (11 Aug 2023 19:22)  HR: 72 (12 Aug 2023 12:57) (63 - 89)  BP: 136/72 (12 Aug 2023 12:57) (123/64 - 152/67)  BP(mean): --  RR: 18 (12 Aug 2023 12:57) (18 - 18)  SpO2: 99% (12 Aug 2023 12:57) (96% - 100%)    Parameters below as of 12 Aug 2023 12:57  Patient On (Oxygen Delivery Method): room air        pantoprazole  Injectable 40 milliGRAM(s) IV Push daily      PHYSICAL EXAM:  GENERAL: NAD,   EYES: conjunctiva and sclera clear  ENMT: Moist mucous membranes  NECK: Supple, No JVD, Normal thyroid  CHEST/LUNG: non labored, cta b/l  HEART: Regular rate and rhythm; No murmurs, rubs, or gallops  ABDOMEN: Soft, Nontender, Nondistended; Bowel sounds present  EXTREMITIES:  2+ Peripheral Pulses, No clubbing, cyanosis, or edema  LYMPH: No lymphadenopathy noted  SKIN: No rashes or lesions    Consultant(s) Notes Reviewed:  [x ] YES  [ ] NO  Care Discussed with Consultants/Other Providers [ x] YES  [ ] NO    LABS:                        9.0    10.87 )-----------( 239      ( 12 Aug 2023 05:36 )             26.6     08-12    144  |  109<H>  |  11  ----------------------------<  97  4.0   |  29  |  0.97    Ca    8.3<L>      12 Aug 2023 05:36    TPro  7.0  /  Alb  3.4  /  TBili  0.5  /  DBili  x   /  AST  15  /  ALT  18  /  AlkPhos  86  08-11    PT/INR - ( 11 Aug 2023 14:48 )   PT: 10.9 sec;   INR: 0.93 ratio         PTT - ( 11 Aug 2023 14:48 )  PTT:27.6 sec  Urinalysis Basic - ( 12 Aug 2023 05:36 )    Color: x / Appearance: x / SG: x / pH: x  Gluc: 97 mg/dL / Ketone: x  / Bili: x / Urobili: x   Blood: x / Protein: x / Nitrite: x   Leuk Esterase: x / RBC: x / WBC x   Sq Epi: x / Non Sq Epi: x / Bacteria: x      CAPILLARY BLOOD GLUCOSE            Urinalysis Basic - ( 12 Aug 2023 05:36 )    Color: x / Appearance: x / SG: x / pH: x  Gluc: 97 mg/dL / Ketone: x  / Bili: x / Urobili: x   Blood: x / Protein: x / Nitrite: x   Leuk Esterase: x / RBC: x / WBC x   Sq Epi: x / Non Sq Epi: x / Bacteria: x          RADIOLOGY & ADDITIONAL TESTS:    Imaging Personally Reviewed:  [x ] YES  [ ] NO

## 2023-08-12 NOTE — CARE COORDINATION ASSESSMENT. - OTHER PERTINENT REFERRAL INFORMATION
Pt alert and oriented X4. Lives home with spouse , Independent with adls and ambulation. No identified SW/CM needs at this time.  Fair

## 2023-08-12 NOTE — CARE COORDINATION ASSESSMENT. - NSCAREPROVIDERS_GEN_ALL_CORE_FT
CARE PROVIDERS:  Accepting Physician: Lorraine Davis  Admitting: Lorraine Davis  Attending: Lorraine Davis  ED ACP: Robe Gordillo  ED Attending: Ladonna Warner  ED Attending2: Pan Jorgensen  ED Nurse: Ramon Rodriguez  Nurse: Malka Spangler  Nurse: Cathy Santizo  Outpatient Provider: Lorraine Davis  Outpatient Provider: Jeovany Pedraza  Override: Malka Spangler  Override: Melina Herrera  PCA/Nursing Assistant: Ruth Sánchez  Primary Team: Caitlin Prado  Primary Team: Ayanna Singer  Primary Team: oRbe Gordillo  : Octavia Osborn

## 2023-08-13 ENCOUNTER — TRANSCRIPTION ENCOUNTER (OUTPATIENT)
Age: 78
End: 2023-08-13

## 2023-08-13 LAB
ANION GAP SERPL CALC-SCNC: 5 MMOL/L — SIGNIFICANT CHANGE UP (ref 5–17)
BUN SERPL-MCNC: 10 MG/DL — SIGNIFICANT CHANGE UP (ref 7–23)
CALCIUM SERPL-MCNC: 8.7 MG/DL — SIGNIFICANT CHANGE UP (ref 8.5–10.1)
CHLORIDE SERPL-SCNC: 108 MMOL/L — SIGNIFICANT CHANGE UP (ref 96–108)
CO2 SERPL-SCNC: 25 MMOL/L — SIGNIFICANT CHANGE UP (ref 22–31)
CREAT SERPL-MCNC: 0.92 MG/DL — SIGNIFICANT CHANGE UP (ref 0.5–1.3)
EGFR: 63 ML/MIN/1.73M2 — SIGNIFICANT CHANGE UP
GLUCOSE SERPL-MCNC: 94 MG/DL — SIGNIFICANT CHANGE UP (ref 70–99)
HCT VFR BLD CALC: 25.9 % — LOW (ref 34.5–45)
HCT VFR BLD CALC: 26.5 % — LOW (ref 34.5–45)
HGB BLD-MCNC: 8.4 G/DL — LOW (ref 11.5–15.5)
HGB BLD-MCNC: 8.6 G/DL — LOW (ref 11.5–15.5)
MCHC RBC-ENTMCNC: 28.5 PG — SIGNIFICANT CHANGE UP (ref 27–34)
MCHC RBC-ENTMCNC: 28.7 PG — SIGNIFICANT CHANGE UP (ref 27–34)
MCHC RBC-ENTMCNC: 32.4 GM/DL — SIGNIFICANT CHANGE UP (ref 32–36)
MCHC RBC-ENTMCNC: 32.5 GM/DL — SIGNIFICANT CHANGE UP (ref 32–36)
MCV RBC AUTO: 87.7 FL — SIGNIFICANT CHANGE UP (ref 80–100)
MCV RBC AUTO: 88.4 FL — SIGNIFICANT CHANGE UP (ref 80–100)
NRBC # BLD: 0 /100 WBCS — SIGNIFICANT CHANGE UP (ref 0–0)
NRBC # BLD: 0 /100 WBCS — SIGNIFICANT CHANGE UP (ref 0–0)
PLATELET # BLD AUTO: 260 K/UL — SIGNIFICANT CHANGE UP (ref 150–400)
PLATELET # BLD AUTO: 266 K/UL — SIGNIFICANT CHANGE UP (ref 150–400)
POTASSIUM SERPL-MCNC: 4.1 MMOL/L — SIGNIFICANT CHANGE UP (ref 3.5–5.3)
POTASSIUM SERPL-SCNC: 4.1 MMOL/L — SIGNIFICANT CHANGE UP (ref 3.5–5.3)
RBC # BLD: 2.93 M/UL — LOW (ref 3.8–5.2)
RBC # BLD: 3.02 M/UL — LOW (ref 3.8–5.2)
RBC # FLD: 15.1 % — HIGH (ref 10.3–14.5)
RBC # FLD: 15.1 % — HIGH (ref 10.3–14.5)
SODIUM SERPL-SCNC: 138 MMOL/L — SIGNIFICANT CHANGE UP (ref 135–145)
WBC # BLD: 7.4 K/UL — SIGNIFICANT CHANGE UP (ref 3.8–10.5)
WBC # BLD: 7.44 K/UL — SIGNIFICANT CHANGE UP (ref 3.8–10.5)
WBC # FLD AUTO: 7.4 K/UL — SIGNIFICANT CHANGE UP (ref 3.8–10.5)
WBC # FLD AUTO: 7.44 K/UL — SIGNIFICANT CHANGE UP (ref 3.8–10.5)

## 2023-08-13 RX ORDER — ATENOLOL 25 MG/1
1 TABLET ORAL
Qty: 0 | Refills: 0 | DISCHARGE

## 2023-08-13 RX ORDER — LISINOPRIL/HYDROCHLOROTHIAZIDE 10-12.5 MG
1 TABLET ORAL
Qty: 0 | Refills: 0 | DISCHARGE

## 2023-08-13 RX ADMIN — PANTOPRAZOLE SODIUM 40 MILLIGRAM(S): 20 TABLET, DELAYED RELEASE ORAL at 11:42

## 2023-08-13 NOTE — DISCHARGE NOTE PROVIDER - CARE PROVIDERS DIRECT ADDRESSES
,DirectAddress_Unknown,[email or web address] PAST MEDICAL HISTORY:  Reactive airway disease     Spitz nevus right chin

## 2023-08-13 NOTE — DISCHARGE NOTE PROVIDER - HOSPITAL COURSE
78-year-old female pmh HTN, HLD, recent colonoscopy-- GI AVM, aw few days hx of diarrhea, dizziness and sob, sent in by pcp for blood transfusion dt worsening anemia on oupt labs    #symptomatic anemia- acute blood loss anemia dt GIbleed  +FOBT  receiving 1u  prbc transfusion in ED  stable hb, tolerating advancemnet of diet  outpt fu Dr Sanchez -has tuesday appt    #HTN- hold home meds in the setting of blood loss anemia  monitor Bp and start meds  after fu with GI/Pcp  hold asa-fu pcp/GI   78-year-old female pmh HTN, HLD,  sent in today by Dr. Kezia Rosa for blood transfusion.    Patient reports that she had labs done a few weeks ago which was found to be anemic.  Patient followed up with a gastroenterologist Dr. Sanchez.   Patient had a colonoscopy in July 31 in which she reports she had a polyp/AVM .  Patient notes that she had some mild blood in her diarrhea for about 4 days after her colonoscopy. pt admits to intermittent SOB and palpitations. Patient had blood test drawn yesterday in which she was advised to go to the ER immediately for blood transfusion. Pt was admitted and seen by GI. she underwent blood transfusion and was monitored for further bleeding. She remained stable and was discharged home.   LABS:                        10.2   5.86  )-----------( 269      ( 17 Aug 2023 15:25 )             31.0     08-17    138  |  107  |  10  ----------------------------<  100<H>  3.8   |  25  |  0.86    Ca    8.3<L>      17 Aug 2023 06:13    RADIOLOGY & ADDITIONAL TESTS:

## 2023-08-13 NOTE — DISCHARGE NOTE PROVIDER - NSDCCPCAREPLAN_GEN_ALL_CORE_FT
PRINCIPAL DISCHARGE DIAGNOSIS  Diagnosis: Anemia  Assessment and Plan of Treatment: blood loss anemia dt gibleed, sp iu prbc, outpt gi fu

## 2023-08-13 NOTE — DISCHARGE NOTE PROVIDER - CARE PROVIDER_API CALL
Akbar Sanchez  Gastroenterology  [address], [address]  [place] [address]  Phone: [phone]  Fax: [phone]  Follow Up Time: 1-3 days    Michel Terrell  Internal Medicine  [address]  [place] [address]  Phone: [phone]  Fax: [phone]  Follow Up Time: 2 weeks

## 2023-08-13 NOTE — DISCHARGE NOTE PROVIDER - NSDCMRMEDTOKEN_GEN_ALL_CORE_FT
atenolol 25 mg oral tablet: 1 tab(s) orally once a day  lisinopril 20 mg oral tablet: 1 tab(s) orally once a day  pantoprazole 40 mg oral delayed release tablet: 1 tab(s) orally once a day (before a meal)

## 2023-08-13 NOTE — DISCHARGE NOTE PROVIDER - PROVIDER TOKENS
PROVIDER:[TOKEN:[[id number]],FOLLOWUP:[1-3 days]],PROVIDER:[TOKEN:[[id number]],FOLLOWUP:[2 weeks]]

## 2023-08-13 NOTE — PROGRESS NOTE ADULT - SUBJECTIVE AND OBJECTIVE BOX
Chief Complaint:  Patient is a 78y old  Female who presents with a chief complaint of     HPI:    Allergies    penicillin (Rash)    Intolerances        MEDICATIONS  (STANDING):  pantoprazole  Injectable 40 milliGRAM(s) IV Push daily    MEDICATIONS  (PRN):      PAST MEDICAL & SURGICAL HISTORY:  HTN (hypertension)      Hyperlipidemia      GI AVM (gastrointestinal arteriovenous vascular malformation)          FAMILY HISTORY:      Social History  Tobacco:  Alcohol:  Drugs:    ROS  General:  No wt loss, fevers, chills, night sweats, fatigue,   Eyes:  Good vision, no reported pain  ENT:  No sore throat, pain, runny nose, dysphagia  CV:  No pain, palpitations, hypo/hypertension  Resp:  No dyspnea, cough, tachypnea, wheezing  GI:  No pain, No nausea, No vomiting, No diarrhea, No constipation, No weight loss, No fever, No pruritis, No rectal bleeding, No tarry stools, No dysphagia,  :  No pain, bleeding, incontinence, nocturia  Muscle:  No pain, weakness  Neuro:  No weakness, tingling, memory problems  Psych:  No fatigue, insomnia, mood problems, depression  Endocrine:  No polyuria, polydipsia, cold/heat intolerance  Heme:  No petechiae, ecchymosis, easy bruisability  Skin:  No rash, tattoos, scars, edema      PHYSICAL EXAM:   Vital Signs Last 24 Hrs  T(C): 36.4 (13 Aug 2023 05:05), Max: 36.8 (12 Aug 2023 20:30)  T(F): 97.5 (13 Aug 2023 05:05), Max: 98.3 (12 Aug 2023 20:30)  HR: 74 (13 Aug 2023 05:05) (70 - 74)  BP: 134/69 (13 Aug 2023 05:05) (96/53 - 136/72)  BP(mean): --  RR: 18 (13 Aug 2023 05:05) (18 - 18)  SpO2: 93% (13 Aug 2023 05:05) (93% - 99%)    Parameters below as of 13 Aug 2023 05:05  Patient On (Oxygen Delivery Method): room air      GENERAL:  Well developed, well-nourished  HEENT:  NC/AT,  conjunctivae clear and pink, no thyromegaly, nodules, adenopathy, no JVD, sclera -anicteric  CHEST: Lungs clear to P&A  ABDOMEN:  Soft, non-tender , non-distended, normoactive bowel sounds,  no masses ,no hepato-splenomegaly, no signs of chronic liver disease  EXTEREMITIES:  no cyanosis,clubbing or edema  SKIN:  No rash/erythema/ecchymoses/petechiae/wounds/abscess/warm/dry  NEURO:  Alert, oriented  Height (cm): 160 (08-11 @ 12:02)  Weight (kg): 61.2 (08-11 @ 12:02)  BMI (kg/m2): 23.9 (08-11 @ 12:02)  BSA (m2): 1.64 (08-11 @ 12:02)    LABS:                        8.6    7.44  )-----------( 266      ( 13 Aug 2023 07:20 )             26.5   77 y/o F consulted for evaluation of anemia and FOB+   Patient is of Dr. Sanchez.   Recently underwent EGD and Colonoscopy as outpatient for evaluation of Iron Def Anemia. EGD essentially unremarkable, bx taken. Colonoscopy revealed AVMs in the proximal colon sp hemostasis with APC and mechanical clipping.  10 days later patient felt hypotensive, was seen in PCP office and Hgb drop from ~11 to ~7. Patient instructed to go to ED.    Labs reviewed and below.   Responded to 1U PRBC appropriately.    During the entire course since procedure, no hematochezia, melena, hematemesis. Had episode of diarrhea a few days ago.    Currently feeling well, tolerating eggs this morning. No active bleeding, no Nausea, vomiting, diarrhea. Denies dizziness, SOB, Chest pain.      08-13    138  |  108  |  10  ----------------------------<  94  4.1   |  25  |  0.92    Ca    8.7      13 Aug 2023 07:20    TPro  7.0  /  Alb  3.4  /  TBili  0.5  /  DBili  x   /  AST  15  /  ALT  18  /  AlkPhos  86  08-11     LIVER FUNCTIONS - ( 11 Aug 2023 14:48 )  Alb: 3.4 g/dL / Pro: 7.0 g/dL / ALK PHOS: 86 U/L / ALT: 18 U/L / AST: 15 U/L / GGT: x           PT/INR - ( 11 Aug 2023 14:48 )   PT: 10.9 sec;   INR: 0.93 ratio         PTT - ( 11 Aug 2023 14:48 )  PTT:27.6 sec  Urinalysis Basic - ( 13 Aug 2023 07:20 )    Color: x / Appearance: x / SG: x / pH: x  Gluc: 94 mg/dL / Ketone: x  / Bili: x / Urobili: x   Blood: x / Protein: x / Nitrite: x   Leuk Esterase: x / RBC: x / WBC x   Sq Epi: x / Non Sq Epi: x / Bacteria: x                 Chief Complaint:  anemia    HPI:    77 y/o F consulted for evaluation of anemia and FOB+   Patient is of Dr. Sanchez.   Recently underwent EGD and Colonoscopy as outpatient for evaluation of Iron Def Anemia. EGD essentially unremarkable, bx taken. Colonoscopy revealed AVMs in the proximal colon sp hemostasis with APC and mechanical clipping.  10 days later patient felt hypotensive, was seen in PCP office and Hgb drop from ~11 to ~7. Patient instructed to go to ED.    Labs reviewed and below.   Responded to 1U PRBC appropriately.    During the entire course since procedure, no hematochezia, melena, hematemesis. Had episode of diarrhea a few days ago.    Currently feeling well, tolerating eggs this morning. No active bleeding, no Nausea, vomiting, diarrhea. Denies dizziness, SOB, Chest pain.    Allergies    penicillin (Rash)    Intolerances        MEDICATIONS  (STANDING):  pantoprazole  Injectable 40 milliGRAM(s) IV Push daily    MEDICATIONS  (PRN):      PAST MEDICAL & SURGICAL HISTORY:  HTN (hypertension)      Hyperlipidemia      GI AVM (gastrointestinal arteriovenous vascular malformation)          FAMILY HISTORY:      Social History  Tobacco:  Alcohol:  Drugs:    ROS  General:  No wt loss, fevers, chills, night sweats, fatigue,   Eyes:  Good vision, no reported pain  ENT:  No sore throat, pain, runny nose, dysphagia  CV:  No pain, palpitations, hypo/hypertension  Resp:  No dyspnea, cough, tachypnea, wheezing  GI:  No pain, No nausea, No vomiting, No diarrhea, No constipation, No weight loss, No fever, No pruritis, No rectal bleeding, No tarry stools, No dysphagia,  :  No pain, bleeding, incontinence, nocturia  Muscle:  No pain, weakness  Neuro:  No weakness, tingling, memory problems  Psych:  No fatigue, insomnia, mood problems, depression  Endocrine:  No polyuria, polydipsia, cold/heat intolerance  Heme:  No petechiae, ecchymosis, easy bruisability  Skin:  No rash, tattoos, scars, edema      PHYSICAL EXAM:   Vital Signs Last 24 Hrs  T(C): 36.4 (13 Aug 2023 05:05), Max: 36.8 (12 Aug 2023 20:30)  T(F): 97.5 (13 Aug 2023 05:05), Max: 98.3 (12 Aug 2023 20:30)  HR: 74 (13 Aug 2023 05:05) (70 - 74)  BP: 134/69 (13 Aug 2023 05:05) (96/53 - 136/72)  BP(mean): --  RR: 18 (13 Aug 2023 05:05) (18 - 18)  SpO2: 93% (13 Aug 2023 05:05) (93% - 99%)    Parameters below as of 13 Aug 2023 05:05  Patient On (Oxygen Delivery Method): room air      GENERAL:  Well developed, well-nourished  HEENT:  NC/AT,  conjunctivae clear and pink, no thyromegaly, nodules, adenopathy, no JVD, sclera -anicteric  CHEST: Lungs clear to P&A  ABDOMEN:  Soft, non-tender , non-distended, normoactive bowel sounds,  no masses ,no hepato-splenomegaly, no signs of chronic liver disease  EXTEREMITIES:  no cyanosis,clubbing or edema  SKIN:  No rash/erythema/ecchymoses/petechiae/wounds/abscess/warm/dry  NEURO:  Alert, oriented  Height (cm): 160 (08-11 @ 12:02)  Weight (kg): 61.2 (08-11 @ 12:02)  BMI (kg/m2): 23.9 (08-11 @ 12:02)  BSA (m2): 1.64 (08-11 @ 12:02)    LABS:                        8.6    7.44  )-----------( 266      ( 13 Aug 2023 07:20 )             26.5         08-13    138  |  108  |  10  ----------------------------<  94  4.1   |  25  |  0.92    Ca    8.7      13 Aug 2023 07:20    TPro  7.0  /  Alb  3.4  /  TBili  0.5  /  DBili  x   /  AST  15  /  ALT  18  /  AlkPhos  86  08-11     LIVER FUNCTIONS - ( 11 Aug 2023 14:48 )  Alb: 3.4 g/dL / Pro: 7.0 g/dL / ALK PHOS: 86 U/L / ALT: 18 U/L / AST: 15 U/L / GGT: x           PT/INR - ( 11 Aug 2023 14:48 )   PT: 10.9 sec;   INR: 0.93 ratio         PTT - ( 11 Aug 2023 14:48 )  PTT:27.6 sec  Urinalysis Basic - ( 13 Aug 2023 07:20 )    Color: x / Appearance: x / SG: x / pH: x  Gluc: 94 mg/dL / Ketone: x  / Bili: x / Urobili: x   Blood: x / Protein: x / Nitrite: x   Leuk Esterase: x / RBC: x / WBC x   Sq Epi: x / Non Sq Epi: x / Bacteria: x

## 2023-08-13 NOTE — PROGRESS NOTE ADULT - SUBJECTIVE AND OBJECTIVE BOX
Patient is a 78y old  Female who presents with a chief complaint of Anemia (13 Aug 2023 10:11)      INTERVAL HPI/OVERNIGHT EVENTS: noted  pt seen and examined this am   events noted  feels well      Vital Signs Last 24 Hrs  T(C): 36.6 (13 Aug 2023 11:59), Max: 36.8 (12 Aug 2023 20:30)  T(F): 97.8 (13 Aug 2023 11:59), Max: 98.3 (12 Aug 2023 20:30)  HR: 80 (13 Aug 2023 11:59) (70 - 80)  BP: 127/67 (13 Aug 2023 11:59) (96/53 - 134/69)  BP(mean): --  RR: 18 (13 Aug 2023 11:59) (18 - 18)  SpO2: 97% (13 Aug 2023 11:59) (93% - 97%)    Parameters below as of 13 Aug 2023 11:59  Patient On (Oxygen Delivery Method): room air        pantoprazole  Injectable 40 milliGRAM(s) IV Push daily      PHYSICAL EXAM:  GENERAL: NAD,   EYES: conjunctiva and sclera clear  ENMT: Moist mucous membranes  NECK: Supple, No JVD, Normal thyroid  CHEST/LUNG: non labored, cta b/l  HEART: Regular rate and rhythm; No murmurs, rubs, or gallops  ABDOMEN: Soft, Nontender, Nondistended; Bowel sounds present  EXTREMITIES:  2+ Peripheral Pulses, No clubbing, cyanosis, or edema  LYMPH: No lymphadenopathy noted  SKIN: No rashes or lesions    Consultant(s) Notes Reviewed:  [x ] YES  [ ] NO  Care Discussed with Consultants/Other Providers [ x] YES  [ ] NO    LABS:                        8.4    7.40  )-----------( 260      ( 13 Aug 2023 16:11 )             25.9     08-13    138  |  108  |  10  ----------------------------<  94  4.1   |  25  |  0.92    Ca    8.7      13 Aug 2023 07:20        Urinalysis Basic - ( 13 Aug 2023 07:20 )    Color: x / Appearance: x / SG: x / pH: x  Gluc: 94 mg/dL / Ketone: x  / Bili: x / Urobili: x   Blood: x / Protein: x / Nitrite: x   Leuk Esterase: x / RBC: x / WBC x   Sq Epi: x / Non Sq Epi: x / Bacteria: x      CAPILLARY BLOOD GLUCOSE            Urinalysis Basic - ( 13 Aug 2023 07:20 )    Color: x / Appearance: x / SG: x / pH: x  Gluc: 94 mg/dL / Ketone: x  / Bili: x / Urobili: x   Blood: x / Protein: x / Nitrite: x   Leuk Esterase: x / RBC: x / WBC x   Sq Epi: x / Non Sq Epi: x / Bacteria: x          RADIOLOGY & ADDITIONAL TESTS:    Imaging Personally Reviewed:  [x ] YES  [ ] NO

## 2023-08-14 LAB
ANION GAP SERPL CALC-SCNC: 6 MMOL/L — SIGNIFICANT CHANGE UP (ref 5–17)
BUN SERPL-MCNC: 13 MG/DL — SIGNIFICANT CHANGE UP (ref 7–23)
CALCIUM SERPL-MCNC: 8.5 MG/DL — SIGNIFICANT CHANGE UP (ref 8.5–10.1)
CHLORIDE SERPL-SCNC: 109 MMOL/L — HIGH (ref 96–108)
CO2 SERPL-SCNC: 25 MMOL/L — SIGNIFICANT CHANGE UP (ref 22–31)
CREAT SERPL-MCNC: 0.91 MG/DL — SIGNIFICANT CHANGE UP (ref 0.5–1.3)
EGFR: 64 ML/MIN/1.73M2 — SIGNIFICANT CHANGE UP
GLUCOSE SERPL-MCNC: 100 MG/DL — HIGH (ref 70–99)
HCT VFR BLD CALC: 24.6 % — LOW (ref 34.5–45)
HCT VFR BLD CALC: 25.5 % — LOW (ref 34.5–45)
HGB BLD-MCNC: 8 G/DL — LOW (ref 11.5–15.5)
HGB BLD-MCNC: 8.2 G/DL — LOW (ref 11.5–15.5)
MCHC RBC-ENTMCNC: 28.1 PG — SIGNIFICANT CHANGE UP (ref 27–34)
MCHC RBC-ENTMCNC: 28.6 PG — SIGNIFICANT CHANGE UP (ref 27–34)
MCHC RBC-ENTMCNC: 32.2 GM/DL — SIGNIFICANT CHANGE UP (ref 32–36)
MCHC RBC-ENTMCNC: 32.5 GM/DL — SIGNIFICANT CHANGE UP (ref 32–36)
MCV RBC AUTO: 87.3 FL — SIGNIFICANT CHANGE UP (ref 80–100)
MCV RBC AUTO: 87.9 FL — SIGNIFICANT CHANGE UP (ref 80–100)
NRBC # BLD: 0 /100 WBCS — SIGNIFICANT CHANGE UP (ref 0–0)
NRBC # BLD: 0 /100 WBCS — SIGNIFICANT CHANGE UP (ref 0–0)
PLATELET # BLD AUTO: 272 K/UL — SIGNIFICANT CHANGE UP (ref 150–400)
PLATELET # BLD AUTO: 275 K/UL — SIGNIFICANT CHANGE UP (ref 150–400)
POTASSIUM SERPL-MCNC: 4.1 MMOL/L — SIGNIFICANT CHANGE UP (ref 3.5–5.3)
POTASSIUM SERPL-SCNC: 4.1 MMOL/L — SIGNIFICANT CHANGE UP (ref 3.5–5.3)
RBC # BLD: 2.8 M/UL — LOW (ref 3.8–5.2)
RBC # BLD: 2.92 M/UL — LOW (ref 3.8–5.2)
RBC # FLD: 15 % — HIGH (ref 10.3–14.5)
RBC # FLD: 15.2 % — HIGH (ref 10.3–14.5)
SODIUM SERPL-SCNC: 140 MMOL/L — SIGNIFICANT CHANGE UP (ref 135–145)
WBC # BLD: 7.09 K/UL — SIGNIFICANT CHANGE UP (ref 3.8–10.5)
WBC # BLD: 7.52 K/UL — SIGNIFICANT CHANGE UP (ref 3.8–10.5)
WBC # FLD AUTO: 7.09 K/UL — SIGNIFICANT CHANGE UP (ref 3.8–10.5)
WBC # FLD AUTO: 7.52 K/UL — SIGNIFICANT CHANGE UP (ref 3.8–10.5)

## 2023-08-14 RX ORDER — PANTOPRAZOLE SODIUM 20 MG/1
40 TABLET, DELAYED RELEASE ORAL
Refills: 0 | Status: DISCONTINUED | OUTPATIENT
Start: 2023-08-14 | End: 2023-08-18

## 2023-08-14 RX ORDER — LISINOPRIL 2.5 MG/1
20 TABLET ORAL ONCE
Refills: 0 | Status: COMPLETED | OUTPATIENT
Start: 2023-08-14 | End: 2023-08-14

## 2023-08-14 RX ADMIN — PANTOPRAZOLE SODIUM 40 MILLIGRAM(S): 20 TABLET, DELAYED RELEASE ORAL at 12:18

## 2023-08-14 RX ADMIN — LISINOPRIL 20 MILLIGRAM(S): 2.5 TABLET ORAL at 06:45

## 2023-08-15 LAB
HCT VFR BLD CALC: 26.8 % — LOW (ref 34.5–45)
HGB BLD-MCNC: 9.2 G/DL — LOW (ref 11.5–15.5)
MCHC RBC-ENTMCNC: 28.6 PG — SIGNIFICANT CHANGE UP (ref 27–34)
MCHC RBC-ENTMCNC: 34.3 GM/DL — SIGNIFICANT CHANGE UP (ref 32–36)
MCV RBC AUTO: 83.2 FL — SIGNIFICANT CHANGE UP (ref 80–100)
NRBC # BLD: 0 /100 WBCS — SIGNIFICANT CHANGE UP (ref 0–0)
PLATELET # BLD AUTO: 269 K/UL — SIGNIFICANT CHANGE UP (ref 150–400)
RBC # BLD: 3.22 M/UL — LOW (ref 3.8–5.2)
RBC # FLD: 17.7 % — HIGH (ref 10.3–14.5)
WBC # BLD: 8.03 K/UL — SIGNIFICANT CHANGE UP (ref 3.8–10.5)
WBC # FLD AUTO: 8.03 K/UL — SIGNIFICANT CHANGE UP (ref 3.8–10.5)

## 2023-08-15 RX ORDER — LISINOPRIL 2.5 MG/1
20 TABLET ORAL DAILY
Refills: 0 | Status: DISCONTINUED | OUTPATIENT
Start: 2023-08-15 | End: 2023-08-18

## 2023-08-15 RX ORDER — ATENOLOL 25 MG/1
25 TABLET ORAL DAILY
Refills: 0 | Status: DISCONTINUED | OUTPATIENT
Start: 2023-08-15 | End: 2023-08-18

## 2023-08-15 RX ADMIN — PANTOPRAZOLE SODIUM 40 MILLIGRAM(S): 20 TABLET, DELAYED RELEASE ORAL at 05:56

## 2023-08-15 RX ADMIN — LISINOPRIL 20 MILLIGRAM(S): 2.5 TABLET ORAL at 14:40

## 2023-08-15 NOTE — PROGRESS NOTE ADULT - SUBJECTIVE AND OBJECTIVE BOX
Patient is a 78y old  Female who presents with a chief complaint of Anemia (13 Aug 2023 10:11)      INTERVAL HPI/OVERNIGHT EVENTS: noted  pt seen and examined this am   events noted  sp 1u prbc yesterday  +small bloody bm yesterday        Vital Signs Last 24 Hrs  T(C): 36.5 (15 Aug 2023 14:08), Max: 36.7 (14 Aug 2023 17:15)  T(F): 97.7 (15 Aug 2023 14:08), Max: 98.1 (14 Aug 2023 20:21)  HR: 78 (15 Aug 2023 14:08) (64 - 78)  BP: 150/69 (15 Aug 2023 14:08) (131/71 - 167/81)  BP(mean): --  RR: 17 (15 Aug 2023 14:08) (17 - 19)  SpO2: 93% (15 Aug 2023 05:10) (93% - 97%)    Parameters below as of 15 Aug 2023 05:10  Patient On (Oxygen Delivery Method): room air        pantoprazole    Tablet 40 milliGRAM(s) Oral before breakfast      PHYSICAL EXAM:  GENERAL: NAD,   EYES: conjunctiva and sclera clear  ENMT: Moist mucous membranes  NECK: Supple, No JVD, Normal thyroid  CHEST/LUNG: non labored, cta b/l  HEART: Regular rate and rhythm; No murmurs, rubs, or gallops  ABDOMEN: Soft, Nontender, Nondistended; Bowel sounds present  EXTREMITIES:  2+ Peripheral Pulses, No clubbing, cyanosis, or edema  LYMPH: No lymphadenopathy noted  SKIN: No rashes or lesions    Consultant(s) Notes Reviewed:  [x ] YES  [ ] NO  Care Discussed with Consultants/Other Providers [ x] YES  [ ] NO    LABS:                        9.2    8.03  )-----------( 269      ( 15 Aug 2023 05:30 )             26.8     08-14    140  |  109<H>  |  13  ----------------------------<  100<H>  4.1   |  25  |  0.91    Ca    8.5      14 Aug 2023 07:17        Urinalysis Basic - ( 14 Aug 2023 07:17 )    Color: x / Appearance: x / SG: x / pH: x  Gluc: 100 mg/dL / Ketone: x  / Bili: x / Urobili: x   Blood: x / Protein: x / Nitrite: x   Leuk Esterase: x / RBC: x / WBC x   Sq Epi: x / Non Sq Epi: x / Bacteria: x      CAPILLARY BLOOD GLUCOSE            Urinalysis Basic - ( 14 Aug 2023 07:17 )    Color: x / Appearance: x / SG: x / pH: x  Gluc: 100 mg/dL / Ketone: x  / Bili: x / Urobili: x   Blood: x / Protein: x / Nitrite: x   Leuk Esterase: x / RBC: x / WBC x   Sq Epi: x / Non Sq Epi: x / Bacteria: x          RADIOLOGY & ADDITIONAL TESTS:    Imaging Personally Reviewed:  [x ] YES  [ ] NO

## 2023-08-16 LAB
ANION GAP SERPL CALC-SCNC: 8 MMOL/L — SIGNIFICANT CHANGE UP (ref 5–17)
BASOPHILS # BLD AUTO: 0.04 K/UL — SIGNIFICANT CHANGE UP (ref 0–0.2)
BASOPHILS NFR BLD AUTO: 0.6 % — SIGNIFICANT CHANGE UP (ref 0–2)
BUN SERPL-MCNC: 12 MG/DL — SIGNIFICANT CHANGE UP (ref 7–23)
CALCIUM SERPL-MCNC: 8.6 MG/DL — SIGNIFICANT CHANGE UP (ref 8.5–10.1)
CHLORIDE SERPL-SCNC: 104 MMOL/L — SIGNIFICANT CHANGE UP (ref 96–108)
CO2 SERPL-SCNC: 25 MMOL/L — SIGNIFICANT CHANGE UP (ref 22–31)
CREAT SERPL-MCNC: 0.96 MG/DL — SIGNIFICANT CHANGE UP (ref 0.5–1.3)
EGFR: 60 ML/MIN/1.73M2 — SIGNIFICANT CHANGE UP
EOSINOPHIL # BLD AUTO: 0.11 K/UL — SIGNIFICANT CHANGE UP (ref 0–0.5)
EOSINOPHIL NFR BLD AUTO: 1.6 % — SIGNIFICANT CHANGE UP (ref 0–6)
GLUCOSE SERPL-MCNC: 95 MG/DL — SIGNIFICANT CHANGE UP (ref 70–99)
HCT VFR BLD CALC: 27.1 % — LOW (ref 34.5–45)
HCT VFR BLD CALC: 29 % — LOW (ref 34.5–45)
HCT VFR BLD CALC: 30 % — LOW (ref 34.5–45)
HGB BLD-MCNC: 9.1 G/DL — LOW (ref 11.5–15.5)
HGB BLD-MCNC: 9.6 G/DL — LOW (ref 11.5–15.5)
HGB BLD-MCNC: 9.7 G/DL — LOW (ref 11.5–15.5)
IMM GRANULOCYTES NFR BLD AUTO: 0.3 % — SIGNIFICANT CHANGE UP (ref 0–0.9)
LYMPHOCYTES # BLD AUTO: 0.94 K/UL — LOW (ref 1–3.3)
LYMPHOCYTES # BLD AUTO: 13.5 % — SIGNIFICANT CHANGE UP (ref 13–44)
MCHC RBC-ENTMCNC: 27.6 PG — SIGNIFICANT CHANGE UP (ref 27–34)
MCHC RBC-ENTMCNC: 28 PG — SIGNIFICANT CHANGE UP (ref 27–34)
MCHC RBC-ENTMCNC: 33.1 GM/DL — SIGNIFICANT CHANGE UP (ref 32–36)
MCHC RBC-ENTMCNC: 33.6 GM/DL — SIGNIFICANT CHANGE UP (ref 32–36)
MCV RBC AUTO: 83.3 FL — SIGNIFICANT CHANGE UP (ref 80–100)
MCV RBC AUTO: 83.4 FL — SIGNIFICANT CHANGE UP (ref 80–100)
MONOCYTES # BLD AUTO: 0.87 K/UL — SIGNIFICANT CHANGE UP (ref 0–0.9)
MONOCYTES NFR BLD AUTO: 12.5 % — SIGNIFICANT CHANGE UP (ref 2–14)
NEUTROPHILS # BLD AUTO: 4.99 K/UL — SIGNIFICANT CHANGE UP (ref 1.8–7.4)
NEUTROPHILS NFR BLD AUTO: 71.5 % — SIGNIFICANT CHANGE UP (ref 43–77)
NRBC # BLD: 0 /100 WBCS — SIGNIFICANT CHANGE UP (ref 0–0)
NRBC # BLD: 0 /100 WBCS — SIGNIFICANT CHANGE UP (ref 0–0)
PLATELET # BLD AUTO: 254 K/UL — SIGNIFICANT CHANGE UP (ref 150–400)
PLATELET # BLD AUTO: 268 K/UL — SIGNIFICANT CHANGE UP (ref 150–400)
POTASSIUM SERPL-MCNC: 3.7 MMOL/L — SIGNIFICANT CHANGE UP (ref 3.5–5.3)
POTASSIUM SERPL-SCNC: 3.7 MMOL/L — SIGNIFICANT CHANGE UP (ref 3.5–5.3)
RBC # BLD: 3.25 M/UL — LOW (ref 3.8–5.2)
RBC # BLD: 3.48 M/UL — LOW (ref 3.8–5.2)
RBC # FLD: 17.4 % — HIGH (ref 10.3–14.5)
RBC # FLD: 17.6 % — HIGH (ref 10.3–14.5)
SODIUM SERPL-SCNC: 137 MMOL/L — SIGNIFICANT CHANGE UP (ref 135–145)
WBC # BLD: 6.97 K/UL — SIGNIFICANT CHANGE UP (ref 3.8–10.5)
WBC # BLD: 7.65 K/UL — SIGNIFICANT CHANGE UP (ref 3.8–10.5)
WBC # FLD AUTO: 6.97 K/UL — SIGNIFICANT CHANGE UP (ref 3.8–10.5)
WBC # FLD AUTO: 7.65 K/UL — SIGNIFICANT CHANGE UP (ref 3.8–10.5)

## 2023-08-16 RX ADMIN — ATENOLOL 25 MILLIGRAM(S): 25 TABLET ORAL at 05:06

## 2023-08-16 RX ADMIN — PANTOPRAZOLE SODIUM 40 MILLIGRAM(S): 20 TABLET, DELAYED RELEASE ORAL at 05:06

## 2023-08-16 RX ADMIN — LISINOPRIL 20 MILLIGRAM(S): 2.5 TABLET ORAL at 05:06

## 2023-08-16 NOTE — PROGRESS NOTE ADULT - SUBJECTIVE AND OBJECTIVE BOX
Patient had a colonoscopy about 10 days ago and 2 angiodysplasia were vaporized with APC and clipped and 4 days later patient presented with LGI bleeding so far received 2 units of blood. Hgb stable around 9 gm and n signs of bleeding.  Patient examined sitting in her chair conjunctiva pale , pulse 72 in no distress. Abdomen soft not tender Plan repeat CBC at 2 pm remain on clear liquids. Will determine plan pending repeat Hgb. If stable would advance diet and consider discharge tomorrow .

## 2023-08-16 NOTE — PROGRESS NOTE ADULT - SUBJECTIVE AND OBJECTIVE BOX
Patient is a 78y old  Female who presents with a chief complaint of GI bleeding (16 Aug 2023 07:49)        INTERVAL HPI/OVERNIGHT EVENTS:   no complaints  pt seen and examined         Vital Signs Last 24 Hrs  T(C): 37.6 (16 Aug 2023 12:26), Max: 37.6 (16 Aug 2023 12:26)  T(F): 99.6 (16 Aug 2023 12:26), Max: 99.6 (16 Aug 2023 12:26)  HR: 62 (16 Aug 2023 12:26) (62 - 100)  BP: 109/61 (16 Aug 2023 12:26) (109/61 - 148/72)  BP(mean): --  RR: 18 (16 Aug 2023 12:26) (17 - 18)  SpO2: 94% (16 Aug 2023 12:26) (92% - 94%)    Parameters below as of 16 Aug 2023 12:26  Patient On (Oxygen Delivery Method): room air        atenolol  Tablet 25 milliGRAM(s) Oral daily  lisinopril 20 milliGRAM(s) Oral daily  pantoprazole    Tablet 40 milliGRAM(s) Oral before breakfast      PHYSICAL EXAM:  GENERAL: NAD   EYES: conjunctiva and sclera clear  ENMT: Moist mucous membranes  NECK: Supple, No JVD, Normal thyroid  CHEST/LUNG: non labored, cta b/l  HEART: Regular rate and rhythm; No murmurs, rubs, or gallops  ABDOMEN: Soft, Nontender, Nondistended; Bowel sounds present  EXTREMITIES:  2+ Peripheral Pulses, No clubbing, cyanosis, or edema  LYMPH: No lymphadenopathy noted  SKIN: No rashes or lesions    Consultant(s) Notes Reviewed:  [x ] YES  [ ] NO  Care Discussed with Consultants/Other Providers [ x] YES  [ ] NO    LABS:                        9.6    7.65  )-----------( 254      ( 16 Aug 2023 09:12 )             29.0     08-16    137  |  104  |  12  ----------------------------<  95  3.7   |  25  |  0.96    Ca    8.6      16 Aug 2023 08:00        Urinalysis Basic - ( 16 Aug 2023 08:00 )    Color: x / Appearance: x / SG: x / pH: x  Gluc: 95 mg/dL / Ketone: x  / Bili: x / Urobili: x   Blood: x / Protein: x / Nitrite: x   Leuk Esterase: x / RBC: x / WBC x   Sq Epi: x / Non Sq Epi: x / Bacteria: x      CAPILLARY BLOOD GLUCOSE            Urinalysis Basic - ( 16 Aug 2023 08:00 )    Color: x / Appearance: x / SG: x / pH: x  Gluc: 95 mg/dL / Ketone: x  / Bili: x / Urobili: x   Blood: x / Protein: x / Nitrite: x   Leuk Esterase: x / RBC: x / WBC x   Sq Epi: x / Non Sq Epi: x / Bacteria: x          RADIOLOGY & ADDITIONAL TESTS:    Imaging Personally Reviewed  Reviewed consultants input

## 2023-08-16 NOTE — CASE MANAGEMENT PROGRESS NOTE - NSCMPROGRESSNOTE_GEN_ALL_CORE
Discussed patient in interdisciplinary rounds.  Patient admitted with anemia and is potential for D/C home today pending HG result in afternoon.  Do not anticipated any [facility] services.  WIll remain available from a case management perspective.

## 2023-08-17 LAB
ANION GAP SERPL CALC-SCNC: 6 MMOL/L — SIGNIFICANT CHANGE UP (ref 5–17)
BUN SERPL-MCNC: 10 MG/DL — SIGNIFICANT CHANGE UP (ref 7–23)
CALCIUM SERPL-MCNC: 8.3 MG/DL — LOW (ref 8.5–10.1)
CHLORIDE SERPL-SCNC: 107 MMOL/L — SIGNIFICANT CHANGE UP (ref 96–108)
CO2 SERPL-SCNC: 25 MMOL/L — SIGNIFICANT CHANGE UP (ref 22–31)
CREAT SERPL-MCNC: 0.86 MG/DL — SIGNIFICANT CHANGE UP (ref 0.5–1.3)
EGFR: 68 ML/MIN/1.73M2 — SIGNIFICANT CHANGE UP
GLUCOSE SERPL-MCNC: 100 MG/DL — HIGH (ref 70–99)
HCT VFR BLD CALC: 26 % — LOW (ref 34.5–45)
HCT VFR BLD CALC: 31 % — LOW (ref 34.5–45)
HGB BLD-MCNC: 10.2 G/DL — LOW (ref 11.5–15.5)
HGB BLD-MCNC: 8.8 G/DL — LOW (ref 11.5–15.5)
MCHC RBC-ENTMCNC: 28.1 PG — SIGNIFICANT CHANGE UP (ref 27–34)
MCHC RBC-ENTMCNC: 28.4 PG — SIGNIFICANT CHANGE UP (ref 27–34)
MCHC RBC-ENTMCNC: 32.9 GM/DL — SIGNIFICANT CHANGE UP (ref 32–36)
MCHC RBC-ENTMCNC: 33.8 GM/DL — SIGNIFICANT CHANGE UP (ref 32–36)
MCV RBC AUTO: 83.9 FL — SIGNIFICANT CHANGE UP (ref 80–100)
MCV RBC AUTO: 85.4 FL — SIGNIFICANT CHANGE UP (ref 80–100)
NRBC # BLD: 0 /100 WBCS — SIGNIFICANT CHANGE UP (ref 0–0)
NRBC # BLD: 0 /100 WBCS — SIGNIFICANT CHANGE UP (ref 0–0)
PLATELET # BLD AUTO: 229 K/UL — SIGNIFICANT CHANGE UP (ref 150–400)
PLATELET # BLD AUTO: 269 K/UL — SIGNIFICANT CHANGE UP (ref 150–400)
POTASSIUM SERPL-MCNC: 3.8 MMOL/L — SIGNIFICANT CHANGE UP (ref 3.5–5.3)
POTASSIUM SERPL-SCNC: 3.8 MMOL/L — SIGNIFICANT CHANGE UP (ref 3.5–5.3)
RBC # BLD: 3.1 M/UL — LOW (ref 3.8–5.2)
RBC # BLD: 3.63 M/UL — LOW (ref 3.8–5.2)
RBC # FLD: 17.4 % — HIGH (ref 10.3–14.5)
RBC # FLD: 17.5 % — HIGH (ref 10.3–14.5)
SODIUM SERPL-SCNC: 138 MMOL/L — SIGNIFICANT CHANGE UP (ref 135–145)
WBC # BLD: 5.6 K/UL — SIGNIFICANT CHANGE UP (ref 3.8–10.5)
WBC # BLD: 5.86 K/UL — SIGNIFICANT CHANGE UP (ref 3.8–10.5)
WBC # FLD AUTO: 5.6 K/UL — SIGNIFICANT CHANGE UP (ref 3.8–10.5)
WBC # FLD AUTO: 5.86 K/UL — SIGNIFICANT CHANGE UP (ref 3.8–10.5)

## 2023-08-17 RX ORDER — PANTOPRAZOLE SODIUM 20 MG/1
1 TABLET, DELAYED RELEASE ORAL
Qty: 0 | Refills: 0 | DISCHARGE
Start: 2023-08-17

## 2023-08-17 RX ORDER — ATENOLOL 25 MG/1
1 TABLET ORAL
Qty: 0 | Refills: 0 | DISCHARGE
Start: 2023-08-17

## 2023-08-17 RX ORDER — POLYETHYLENE GLYCOL 3350 17 G/17G
17 POWDER, FOR SOLUTION ORAL ONCE
Refills: 0 | Status: DISCONTINUED | OUTPATIENT
Start: 2023-08-17 | End: 2023-08-18

## 2023-08-17 RX ORDER — LISINOPRIL 2.5 MG/1
1 TABLET ORAL
Qty: 0 | Refills: 0 | DISCHARGE
Start: 2023-08-17

## 2023-08-17 RX ADMIN — ATENOLOL 25 MILLIGRAM(S): 25 TABLET ORAL at 06:03

## 2023-08-17 RX ADMIN — LISINOPRIL 20 MILLIGRAM(S): 2.5 TABLET ORAL at 06:02

## 2023-08-17 RX ADMIN — PANTOPRAZOLE SODIUM 40 MILLIGRAM(S): 20 TABLET, DELAYED RELEASE ORAL at 06:02

## 2023-08-17 NOTE — PROGRESS NOTE ADULT - SUBJECTIVE AND OBJECTIVE BOX
no bleeding but hgb 8.8 this am  Conjunctiva pale Vitals stable  Plan repeat Hgb at 2 pm before deciding on discharge.  Miralax Patient hasn't had a BM for 2 days

## 2023-08-18 ENCOUNTER — TRANSCRIPTION ENCOUNTER (OUTPATIENT)
Age: 78
End: 2023-08-18

## 2023-08-18 VITALS
RESPIRATION RATE: 18 BRPM | HEART RATE: 64 BPM | DIASTOLIC BLOOD PRESSURE: 71 MMHG | OXYGEN SATURATION: 96 % | TEMPERATURE: 98 F | SYSTOLIC BLOOD PRESSURE: 126 MMHG

## 2023-08-18 LAB
HCT VFR BLD CALC: 27.2 % — LOW (ref 34.5–45)
HGB BLD-MCNC: 9.1 G/DL — LOW (ref 11.5–15.5)
MCHC RBC-ENTMCNC: 28.2 PG — SIGNIFICANT CHANGE UP (ref 27–34)
MCHC RBC-ENTMCNC: 33.5 GM/DL — SIGNIFICANT CHANGE UP (ref 32–36)
MCV RBC AUTO: 84.2 FL — SIGNIFICANT CHANGE UP (ref 80–100)
NRBC # BLD: 0 /100 WBCS — SIGNIFICANT CHANGE UP (ref 0–0)
PLATELET # BLD AUTO: 255 K/UL — SIGNIFICANT CHANGE UP (ref 150–400)
RBC # BLD: 3.23 M/UL — LOW (ref 3.8–5.2)
RBC # FLD: 17.1 % — HIGH (ref 10.3–14.5)
WBC # BLD: 5.19 K/UL — SIGNIFICANT CHANGE UP (ref 3.8–10.5)
WBC # FLD AUTO: 5.19 K/UL — SIGNIFICANT CHANGE UP (ref 3.8–10.5)

## 2023-08-18 PROCEDURE — P9016: CPT

## 2023-08-18 PROCEDURE — 85025 COMPLETE CBC W/AUTO DIFF WBC: CPT

## 2023-08-18 PROCEDURE — 99285 EMERGENCY DEPT VISIT HI MDM: CPT | Mod: 25

## 2023-08-18 PROCEDURE — 85014 HEMATOCRIT: CPT

## 2023-08-18 PROCEDURE — 80053 COMPREHEN METABOLIC PANEL: CPT

## 2023-08-18 PROCEDURE — 86900 BLOOD TYPING SEROLOGIC ABO: CPT

## 2023-08-18 PROCEDURE — 85730 THROMBOPLASTIN TIME PARTIAL: CPT

## 2023-08-18 PROCEDURE — 36415 COLL VENOUS BLD VENIPUNCTURE: CPT

## 2023-08-18 PROCEDURE — 85027 COMPLETE CBC AUTOMATED: CPT

## 2023-08-18 PROCEDURE — 36430 TRANSFUSION BLD/BLD COMPNT: CPT

## 2023-08-18 PROCEDURE — 80048 BASIC METABOLIC PNL TOTAL CA: CPT

## 2023-08-18 PROCEDURE — 86923 COMPATIBILITY TEST ELECTRIC: CPT

## 2023-08-18 PROCEDURE — 86850 RBC ANTIBODY SCREEN: CPT

## 2023-08-18 PROCEDURE — 86901 BLOOD TYPING SEROLOGIC RH(D): CPT

## 2023-08-18 PROCEDURE — 93005 ELECTROCARDIOGRAM TRACING: CPT

## 2023-08-18 PROCEDURE — 85610 PROTHROMBIN TIME: CPT

## 2023-08-18 PROCEDURE — 84484 ASSAY OF TROPONIN QUANT: CPT

## 2023-08-18 PROCEDURE — 85018 HEMOGLOBIN: CPT

## 2023-08-18 PROCEDURE — 82272 OCCULT BLD FECES 1-3 TESTS: CPT

## 2023-08-18 RX ADMIN — PANTOPRAZOLE SODIUM 40 MILLIGRAM(S): 20 TABLET, DELAYED RELEASE ORAL at 06:21

## 2023-08-18 RX ADMIN — LISINOPRIL 20 MILLIGRAM(S): 2.5 TABLET ORAL at 05:46

## 2023-08-18 RX ADMIN — ATENOLOL 25 MILLIGRAM(S): 25 TABLET ORAL at 05:47

## 2023-08-18 NOTE — DISCHARGE NOTE NURSING/CASE MANAGEMENT/SOCIAL WORK - PATIENT PORTAL LINK FT
You can access the FollowMyHealth Patient Portal offered by [facility] by registering at the following website: [email or web address]. By joining [facility]’s FollowMyHealth portal, you will also be able to view your health information using other applications (apps) compatible with our system.

## 2023-08-18 NOTE — PROGRESS NOTE ADULT - SUBJECTIVE AND OBJECTIVE BOX
Patient is a 78y old  Female who presents with a chief complaint of Anemia     (18 Aug 2023 11:48)        INTERVAL HPI/OVERNIGHT EVENTS:   no complaints  pt seen and examined         Vital Signs Last 24 Hrs  T(C): 36.4 (18 Aug 2023 12:00), Max: 36.9 (17 Aug 2023 20:52)  T(F): 97.5 (18 Aug 2023 12:00), Max: 98.4 (17 Aug 2023 20:52)  HR: 56 (18 Aug 2023 12:00) (56 - 67)  BP: 129/69 (18 Aug 2023 12:00) (129/69 - 147/75)  BP(mean): --  RR: 18 (18 Aug 2023 12:00) (17 - 18)  SpO2: 97% (18 Aug 2023 12:00) (96% - 97%)    Parameters below as of 18 Aug 2023 12:00  Patient On (Oxygen Delivery Method): room air        atenolol  Tablet 25 milliGRAM(s) Oral daily  lisinopril 20 milliGRAM(s) Oral daily  pantoprazole    Tablet 40 milliGRAM(s) Oral before breakfast  polyethylene glycol 3350 17 Gram(s) Oral once      PHYSICAL EXAM:  GENERAL: NAD   EYES: conjunctiva and sclera clear  ENMT: Moist mucous membranes  NECK: Supple, No JVD, Normal thyroid  CHEST/LUNG: non labored, cta b/l  HEART: Regular rate and rhythm; No murmurs, rubs, or gallops  ABDOMEN: Soft, Nontender, Nondistended; Bowel sounds present  EXTREMITIES:  2+ Peripheral Pulses, No clubbing, cyanosis, or edema  LYMPH: No lymphadenopathy noted  SKIN: No rashes or lesions    Consultant(s) Notes Reviewed:  [x ] YES  [ ] NO  Care Discussed with Consultants/Other Providers [ x] YES  [ ] NO    LABS:                        9.1    5.19  )-----------( 255      ( 18 Aug 2023 05:42 )             27.2     08-17    138  |  107  |  10  ----------------------------<  100<H>  3.8   |  25  |  0.86    Ca    8.3<L>      17 Aug 2023 06:13        Urinalysis Basic - ( 17 Aug 2023 06:13 )    Color: x / Appearance: x / SG: x / pH: x  Gluc: 100 mg/dL / Ketone: x  / Bili: x / Urobili: x   Blood: x / Protein: x / Nitrite: x   Leuk Esterase: x / RBC: x / WBC x   Sq Epi: x / Non Sq Epi: x / Bacteria: x      CAPILLARY BLOOD GLUCOSE            Urinalysis Basic - ( 17 Aug 2023 06:13 )    Color: x / Appearance: x / SG: x / pH: x  Gluc: 100 mg/dL / Ketone: x  / Bili: x / Urobili: x   Blood: x / Protein: x / Nitrite: x   Leuk Esterase: x / RBC: x / WBC x   Sq Epi: x / Non Sq Epi: x / Bacteria: x          RADIOLOGY & ADDITIONAL TESTS:    Imaging Personally Reviewed  Reviewed consultants input

## 2023-08-18 NOTE — PROGRESS NOTE ADULT - PROVIDER SPECIALTY LIST ADULT
Hospitalist
Internal Medicine
Hospitalist
Gastroenterology
Internal Medicine
Internal Medicine

## 2023-08-18 NOTE — DISCHARGE NOTE NURSING/CASE MANAGEMENT/SOCIAL WORK - NSDCPEFALRISK_GEN_ALL_CORE
For information on Fall & Injury Prevention, visit: [email or web address] OR  [email or web address] OR  https://www.cdc.gov/steadi/patient.html

## 2023-08-18 NOTE — DIETITIAN INITIAL EVALUATION ADULT - PERTINENT LABORATORY DATA
08-17    138  |  107  |  10  ----------------------------<  100<H>  3.8   |  25  |  0.86    Ca    8.3<L>      17 Aug 2023 06:13

## 2023-08-18 NOTE — DIETITIAN INITIAL EVALUATION ADULT - PERTINENT MEDS FT
MEDICATIONS  (STANDING):  atenolol  Tablet 25 milliGRAM(s) Oral daily  lisinopril 20 milliGRAM(s) Oral daily  pantoprazole    Tablet 40 milliGRAM(s) Oral before breakfast  polyethylene glycol 3350 17 Gram(s) Oral once    MEDICATIONS  (PRN):

## 2023-08-18 NOTE — DIETITIAN INITIAL EVALUATION ADULT - ORAL INTAKE PTA/DIET HISTORY
patient reports with good PO intake . food preferences noted . with soft low Na diet followed PTA . lives with . follows GERD diet with acid reflux history  patient reports for possible discharge today handouts on high Fe food choices and low fiber diet left with patients at this time

## 2023-08-18 NOTE — CASE MANAGEMENT PROGRESS NOTE - NSCMPROGRESSNOTE_GEN_ALL_CORE
Patient is for transition home today. No skilled needs. IMM notice of discharge given and signed. Patient's daughter will take her home today.

## 2023-08-18 NOTE — PROGRESS NOTE ADULT - ASSESSMENT
77 y/o F recent EGD and colonoscopy as outpatient for evaluation of MIKALA admitted ~2 weeks after procedures for anemia, hypotension and lethargy, found to have drop in Hct and FOB+  Patient has responded well to PRBC. Tolerating diet.    DDx occult bleeding from other AVMs, or from intervention on AVMs done recently.    Patient has responded well to PRBC, is feeling well and tolerating diet  She has a scheduled appt on tuesday with Dr. Sanchez  Advance diet  Continue PPI  f/u today CBC - if stable, then ok for discharge and f/u in GI office
78-year-old female pmh HTN, HLD, recent colonoscopy-- GI AVM sp clipping, cautery aw few days hx of diarrhea, dizziness and sob, sent in by pcp for blood transfusion dt worsening anemia on oupt labs    #symptomatic anemia- acute blood loss anemia dt GIbleed  +FOBT, hx of colon AVM- recent clippings  receiving 1u  prbc transfusion in ED  cbc stable-monitor, transfuse prn  cont PPI  GI cs- d/w Dr Clemente- adv diet    #HTN- hold home meds in the setting of blood loss anemia  monitor Bp and start meds as required    #DVT ppx- venodynes    [facility]   [phone]  
78-year-old female pmh HTN, HLD, recent colonoscopy-- GI AVM sp clipping, cautery aw few days hx of diarrhea, dizziness and sob, sent in by pcp for blood transfusion dt worsening anemia on oupt labs    #symptomatic anemia- acute blood loss anemia dt GIbleed  +FOBT, hx of colon AVM- recent clippings  receiving 1u  prbc transfusion in ED  cbc stable-monitor, transfuse prn  cont PPI  GI cs- d/w Dr Clemente- adv diet    #HTN- hold home meds in the setting of blood loss anemia  monitor Bp and start meds as required    #DVT ppx- venodynes    [facility]   [phone]  
78-year-old female pmh HTN, HLD, recent colonoscopy-- GI AVM sp clipping, cautery aw few days hx of diarrhea, dizziness and sob, sent in by pcp for blood transfusion dt worsening anemia on oupt labs    #symptomatic anemia- acute blood loss anemia dt GIbleed  +FOBT, hx of colon/cecal 2 AVMs- recent clippings  sp2u prbc  cbc stable-monitor, transfuse prn  cont PPI  d/w Dr Sanchez- GI re: possible ongoing bleeding -will see pt later today/am  monitor cbc    #HTN- home bp meds on hold given gibleed  bp suboptimally controlled  will start atenolol and lisinopril w parameters and monitor    #DVT ppx- venodynes    [facility]   [phone]  
78-year-old female pmh HTN, HLD, recent colonoscopy-- GI AVM sp clipping, cautery aw few days hx of diarrhea, dizziness and sob, sent in by pcp for blood transfusion dt worsening anemia on oupt labs    #symptomatic anemia- acute blood loss anemia dt GIbleed  +FOBT, hx of colon/cecal 2 AVMs- recent clippings  sp2u prbc  cbc stable-monitor, transfuse prn  cont PPI  d/w Dr Laura vincent h/h  discussed with patient at length  DC home today    #HTN  continue atenolol and lisinopril w parameters and monitor    #DVT ppx- venodynes    [facility]   [phone]
78-year-old female pmh HTN, HLD, recent colonoscopy-- GI AVM sp clipping, cautery aw few days hx of diarrhea, dizziness and sob, sent in by pcp for blood transfusion dt worsening anemia on oupt labs    #symptomatic anemia- acute blood loss anemia dt GIbleed  +FOBT, hx of colon/cecal 2 AVMs- recent clippings  sp2u prbc  cbc stable-monitor, transfuse prn  cont PPI  d/w Dr Sanchez  repeat h/h  may need repeat colonoscopy    #HTN  continue atenolol and lisinopril w parameters and monitor    #DVT ppx- venodynes    [facility]   [phone]

## 2024-05-30 PROBLEM — E78.5 HYPERLIPIDEMIA, UNSPECIFIED: Chronic | Status: ACTIVE | Noted: 2023-08-11

## 2024-05-30 PROBLEM — K55.20 ANGIODYSPLASIA OF COLON WITHOUT HEMORRHAGE: Chronic | Status: ACTIVE | Noted: 2023-08-11

## 2024-05-30 PROBLEM — I10 ESSENTIAL (PRIMARY) HYPERTENSION: Chronic | Status: ACTIVE | Noted: 2023-08-11

## 2024-06-12 ENCOUNTER — APPOINTMENT (OUTPATIENT)
Dept: OPHTHALMOLOGY | Facility: CLINIC | Age: 79
End: 2024-06-12
Payer: MEDICARE

## 2024-06-12 ENCOUNTER — NON-APPOINTMENT (OUTPATIENT)
Age: 79
End: 2024-06-12

## 2024-06-12 PROCEDURE — 92014 COMPRE OPH EXAM EST PT 1/>: CPT

## 2024-06-12 PROCEDURE — 92136 OPHTHALMIC BIOMETRY: CPT

## 2024-06-12 PROCEDURE — 92134 CPTRZ OPH DX IMG PST SGM RTA: CPT
